# Patient Record
Sex: MALE | Race: WHITE | NOT HISPANIC OR LATINO
[De-identification: names, ages, dates, MRNs, and addresses within clinical notes are randomized per-mention and may not be internally consistent; named-entity substitution may affect disease eponyms.]

---

## 2017-01-05 ENCOUNTER — OTHER (OUTPATIENT)
Age: 47
End: 2017-01-05

## 2017-01-06 ENCOUNTER — APPOINTMENT (OUTPATIENT)
Dept: COLORECTAL SURGERY | Facility: HOSPITAL | Age: 47
End: 2017-01-06

## 2017-01-16 ENCOUNTER — FORM ENCOUNTER (OUTPATIENT)
Age: 47
End: 2017-01-16

## 2017-01-17 ENCOUNTER — OUTPATIENT (OUTPATIENT)
Dept: OUTPATIENT SERVICES | Facility: HOSPITAL | Age: 47
LOS: 1 days | End: 2017-01-17
Payer: COMMERCIAL

## 2017-01-17 ENCOUNTER — APPOINTMENT (OUTPATIENT)
Dept: CT IMAGING | Facility: CLINIC | Age: 47
End: 2017-01-17

## 2017-01-17 DIAGNOSIS — Z00.8 ENCOUNTER FOR OTHER GENERAL EXAMINATION: ICD-10-CM

## 2017-01-17 PROCEDURE — 74177 CT ABD & PELVIS W/CONTRAST: CPT

## 2017-01-18 ENCOUNTER — OUTPATIENT (OUTPATIENT)
Dept: OUTPATIENT SERVICES | Facility: HOSPITAL | Age: 47
LOS: 1 days | Discharge: HOME | End: 2017-01-18
Payer: COMMERCIAL

## 2017-01-18 ENCOUNTER — MESSAGE (OUTPATIENT)
Age: 47
End: 2017-01-18

## 2017-01-18 DIAGNOSIS — K57.20 DIVERTICULITIS OF LARGE INTESTINE WITH PERFORATION AND ABSCESS WITHOUT BLEEDING: ICD-10-CM

## 2017-01-18 PROCEDURE — 74270 X-RAY XM COLON 1CNTRST STD: CPT | Mod: 26

## 2017-01-20 ENCOUNTER — OUTPATIENT (OUTPATIENT)
Dept: OUTPATIENT SERVICES | Facility: HOSPITAL | Age: 47
LOS: 1 days | Discharge: HOME | End: 2017-01-20
Payer: COMMERCIAL

## 2017-01-20 ENCOUNTER — APPOINTMENT (OUTPATIENT)
Age: 47
End: 2017-01-20

## 2017-01-20 ENCOUNTER — APPOINTMENT (OUTPATIENT)
Dept: COLORECTAL SURGERY | Facility: HOSPITAL | Age: 47
End: 2017-01-20

## 2017-01-20 PROCEDURE — 45378 DIAGNOSTIC COLONOSCOPY: CPT

## 2017-01-25 ENCOUNTER — RESULT REVIEW (OUTPATIENT)
Age: 47
End: 2017-01-25

## 2017-01-26 ENCOUNTER — APPOINTMENT (OUTPATIENT)
Dept: COLORECTAL SURGERY | Facility: HOSPITAL | Age: 47
End: 2017-01-26

## 2017-01-26 ENCOUNTER — INPATIENT (INPATIENT)
Facility: HOSPITAL | Age: 47
LOS: 3 days | Discharge: HOME | End: 2017-01-30
Attending: COLON & RECTAL SURGERY | Admitting: COLON & RECTAL SURGERY
Payer: COMMERCIAL

## 2017-01-26 DIAGNOSIS — Z93.3 COLOSTOMY STATUS: ICD-10-CM

## 2017-01-26 DIAGNOSIS — N40.0 BENIGN PROSTATIC HYPERPLASIA WITHOUT LOWER URINARY TRACT SYMPTOMS: ICD-10-CM

## 2017-01-26 DIAGNOSIS — K57.20 DIVERTICULITIS OF LARGE INTESTINE WITH PERFORATION AND ABSCESS WITHOUT BLEEDING: ICD-10-CM

## 2017-01-26 DIAGNOSIS — K59.00 CONSTIPATION, UNSPECIFIED: ICD-10-CM

## 2017-01-26 DIAGNOSIS — F41.9 ANXIETY DISORDER, UNSPECIFIED: ICD-10-CM

## 2017-01-26 PROCEDURE — 44204 LAPARO PARTIAL COLECTOMY: CPT | Mod: 59

## 2017-01-26 PROCEDURE — 88304 TISSUE EXAM BY PATHOLOGIST: CPT | Mod: 26

## 2017-01-26 PROCEDURE — 52332 CYSTOSCOPY AND TREATMENT: CPT | Mod: LT

## 2017-01-26 PROCEDURE — 88307 TISSUE EXAM BY PATHOLOGIST: CPT | Mod: 26

## 2017-01-26 PROCEDURE — 44227 LAP CLOSE ENTEROSTOMY: CPT

## 2017-01-28 VITALS — WEIGHT: 238.1 LBS

## 2017-01-28 DIAGNOSIS — F32.9 MAJOR DEPRESSIVE DISORDER, SINGLE EPISODE, UNSPECIFIED: ICD-10-CM

## 2017-01-28 LAB
ALBUMIN SERPL ELPH-MCNC: 3.2 G/DL — LOW (ref 3.3–5)
ANION GAP SERPL CALC-SCNC: 9 MMOL/L — SIGNIFICANT CHANGE UP (ref 5–17)
BASOPHILS # BLD AUTO: 0.1 K/UL — SIGNIFICANT CHANGE UP (ref 0–0.2)
BASOPHILS NFR BLD AUTO: 0.6 % — SIGNIFICANT CHANGE UP (ref 0–2)
BUN SERPL-MCNC: 8 MG/DL — SIGNIFICANT CHANGE UP (ref 7–23)
CALCIUM SERPL-MCNC: 8.5 MG/DL — SIGNIFICANT CHANGE UP (ref 8.5–10.1)
CHLORIDE SERPL-SCNC: 101 MMOL/L — SIGNIFICANT CHANGE UP (ref 96–108)
CO2 SERPL-SCNC: 28 MMOL/L — SIGNIFICANT CHANGE UP (ref 22–31)
CREAT SERPL-MCNC: 0.81 MG/DL — SIGNIFICANT CHANGE UP (ref 0.5–1.3)
EOSINOPHIL # BLD AUTO: 0 K/UL — SIGNIFICANT CHANGE UP (ref 0–0.5)
EOSINOPHIL NFR BLD AUTO: 0.3 % — SIGNIFICANT CHANGE UP (ref 0–6)
GLUCOSE SERPL-MCNC: 103 MG/DL — HIGH (ref 70–99)
HCT VFR BLD CALC: 37.8 % — LOW (ref 39–50)
HGB BLD-MCNC: 12.7 G/DL — LOW (ref 13–17)
LYMPHOCYTES # BLD AUTO: 1 K/UL — SIGNIFICANT CHANGE UP (ref 1–3.3)
LYMPHOCYTES # BLD AUTO: 9.7 % — LOW (ref 13–44)
MAGNESIUM SERPL-MCNC: 2 MG/DL — SIGNIFICANT CHANGE UP (ref 1.8–2.4)
MCHC RBC-ENTMCNC: 30.6 PG — SIGNIFICANT CHANGE UP (ref 27–34)
MCHC RBC-ENTMCNC: 33.7 GM/DL — SIGNIFICANT CHANGE UP (ref 32–36)
MCV RBC AUTO: 91 FL — SIGNIFICANT CHANGE UP (ref 80–100)
MONOCYTES # BLD AUTO: 1.2 K/UL — HIGH (ref 0–0.9)
MONOCYTES NFR BLD AUTO: 11.6 % — SIGNIFICANT CHANGE UP (ref 2–14)
NEUTROPHILS # BLD AUTO: 8.1 K/UL — HIGH (ref 1.8–7.4)
NEUTROPHILS NFR BLD AUTO: 77.9 % — HIGH (ref 43–77)
PHOSPHATE SERPL-MCNC: 1.5 MG/DL — LOW (ref 2.5–4.5)
PLATELET # BLD AUTO: 156 K/UL — SIGNIFICANT CHANGE UP (ref 150–400)
POTASSIUM SERPL-MCNC: 3.7 MMOL/L — SIGNIFICANT CHANGE UP (ref 3.5–5.3)
POTASSIUM SERPL-SCNC: 3.7 MMOL/L — SIGNIFICANT CHANGE UP (ref 3.5–5.3)
RBC # BLD: 4.16 M/UL — LOW (ref 4.2–5.8)
RBC # FLD: 11.4 % — SIGNIFICANT CHANGE UP (ref 10.3–14.5)
SODIUM SERPL-SCNC: 138 MMOL/L — SIGNIFICANT CHANGE UP (ref 135–145)
WBC # BLD: 10.4 K/UL — SIGNIFICANT CHANGE UP (ref 3.8–10.5)
WBC # FLD AUTO: 10.4 K/UL — SIGNIFICANT CHANGE UP (ref 3.8–10.5)

## 2017-01-28 RX ORDER — TAMSULOSIN HYDROCHLORIDE 0.4 MG/1
0.4 CAPSULE ORAL AT BEDTIME
Qty: 0 | Refills: 0 | Status: DISCONTINUED | OUTPATIENT
Start: 2017-01-28 | End: 2017-01-30

## 2017-01-28 RX ORDER — DEXTROSE 50 % IN WATER 50 %
25 SYRINGE (ML) INTRAVENOUS ONCE
Qty: 0 | Refills: 0 | Status: DISCONTINUED | OUTPATIENT
Start: 2017-01-28 | End: 2017-01-30

## 2017-01-28 RX ORDER — GLUCAGON INJECTION, SOLUTION 0.5 MG/.1ML
1 INJECTION, SOLUTION SUBCUTANEOUS ONCE
Qty: 0 | Refills: 0 | Status: DISCONTINUED | OUTPATIENT
Start: 2017-01-28 | End: 2017-01-30

## 2017-01-28 RX ORDER — ONDANSETRON 8 MG/1
4 TABLET, FILM COATED ORAL EVERY 6 HOURS
Qty: 0 | Refills: 0 | Status: DISCONTINUED | OUTPATIENT
Start: 2017-01-28 | End: 2017-01-30

## 2017-01-28 RX ORDER — DIPHENHYDRAMINE HCL 50 MG
50 CAPSULE ORAL EVERY 6 HOURS
Qty: 0 | Refills: 0 | Status: DISCONTINUED | OUTPATIENT
Start: 2017-01-28 | End: 2017-01-30

## 2017-01-28 RX ORDER — HEPARIN SODIUM 5000 [USP'U]/ML
5000 INJECTION INTRAVENOUS; SUBCUTANEOUS EVERY 8 HOURS
Qty: 0 | Refills: 0 | Status: DISCONTINUED | OUTPATIENT
Start: 2017-01-28 | End: 2017-01-30

## 2017-01-28 RX ORDER — ESCITALOPRAM OXALATE 10 MG/1
10 TABLET, FILM COATED ORAL DAILY
Qty: 0 | Refills: 0 | Status: DISCONTINUED | OUTPATIENT
Start: 2017-01-28 | End: 2017-01-30

## 2017-01-28 RX ORDER — DEXTROSE 50 % IN WATER 50 %
1 SYRINGE (ML) INTRAVENOUS ONCE
Qty: 0 | Refills: 0 | Status: DISCONTINUED | OUTPATIENT
Start: 2017-01-28 | End: 2017-01-30

## 2017-01-28 RX ORDER — PANTOPRAZOLE SODIUM 20 MG/1
40 TABLET, DELAYED RELEASE ORAL DAILY
Qty: 0 | Refills: 0 | Status: DISCONTINUED | OUTPATIENT
Start: 2017-01-28 | End: 2017-01-30

## 2017-01-28 RX ORDER — DEXTROSE 50 % IN WATER 50 %
12.5 SYRINGE (ML) INTRAVENOUS ONCE
Qty: 0 | Refills: 0 | Status: DISCONTINUED | OUTPATIENT
Start: 2017-01-28 | End: 2017-01-30

## 2017-01-28 RX ORDER — ALVIMOPAN 12 MG/1
12 CAPSULE ORAL
Qty: 0 | Refills: 0 | Status: DISCONTINUED | OUTPATIENT
Start: 2017-01-28 | End: 2017-01-28

## 2017-01-28 RX ORDER — ALVIMOPAN 12 MG/1
12 CAPSULE ORAL EVERY 12 HOURS
Qty: 0 | Refills: 0 | Status: DISCONTINUED | OUTPATIENT
Start: 2017-01-28 | End: 2017-01-30

## 2017-01-28 RX ORDER — NALOXONE HYDROCHLORIDE 4 MG/.1ML
0.1 SPRAY NASAL
Qty: 0 | Refills: 0 | Status: DISCONTINUED | OUTPATIENT
Start: 2017-01-28 | End: 2017-01-30

## 2017-01-28 RX ORDER — SODIUM CHLORIDE 9 MG/ML
1000 INJECTION, SOLUTION INTRAVENOUS
Qty: 0 | Refills: 0 | Status: DISCONTINUED | OUTPATIENT
Start: 2017-01-28 | End: 2017-01-28

## 2017-01-28 RX ORDER — SODIUM CHLORIDE 9 MG/ML
1000 INJECTION, SOLUTION INTRAVENOUS
Qty: 0 | Refills: 0 | Status: DISCONTINUED | OUTPATIENT
Start: 2017-01-28 | End: 2017-01-30

## 2017-01-28 RX ORDER — POTASSIUM PHOSPHATE, MONOBASIC POTASSIUM PHOSPHATE, DIBASIC 236; 224 MG/ML; MG/ML
15 INJECTION, SOLUTION INTRAVENOUS ONCE
Qty: 0 | Refills: 0 | Status: COMPLETED | OUTPATIENT
Start: 2017-01-28 | End: 2017-01-28

## 2017-01-28 RX ORDER — ACETAMINOPHEN 500 MG
650 TABLET ORAL EVERY 6 HOURS
Qty: 0 | Refills: 0 | Status: DISCONTINUED | OUTPATIENT
Start: 2017-01-28 | End: 2017-01-30

## 2017-01-28 RX ADMIN — Medication 1 TABLET(S): at 23:57

## 2017-01-28 RX ADMIN — TAMSULOSIN HYDROCHLORIDE 0.4 MILLIGRAM(S): 0.4 CAPSULE ORAL at 22:48

## 2017-01-28 RX ADMIN — ESCITALOPRAM OXALATE 10 MILLIGRAM(S): 10 TABLET, FILM COATED ORAL at 23:57

## 2017-01-28 RX ADMIN — Medication 50 MILLIGRAM(S): at 23:03

## 2017-01-28 RX ADMIN — ALVIMOPAN 12 MILLIGRAM(S): 12 CAPSULE ORAL at 22:47

## 2017-01-28 RX ADMIN — POTASSIUM PHOSPHATE, MONOBASIC POTASSIUM PHOSPHATE, DIBASIC 63.75 MILLIMOLE(S): 236; 224 INJECTION, SOLUTION INTRAVENOUS at 23:57

## 2017-01-28 RX ADMIN — PANTOPRAZOLE SODIUM 40 MILLIGRAM(S): 20 TABLET, DELAYED RELEASE ORAL at 23:57

## 2017-01-28 RX ADMIN — HEPARIN SODIUM 5000 UNIT(S): 5000 INJECTION INTRAVENOUS; SUBCUTANEOUS at 22:50

## 2017-01-28 RX ADMIN — HEPARIN SODIUM 5000 UNIT(S): 5000 INJECTION INTRAVENOUS; SUBCUTANEOUS at 23:57

## 2017-01-28 NOTE — PROGRESS NOTE ADULT - ASSESSMENT
POD 2 s/p colostomy reversal which was performed for a history of perforated diverticulitis. Doing well    - Advance to full liquids  - stop IVFs  - continue PCA and transition to oral pain medication over next 24 hours  - replace phos  - Ambulate and IS  - VTE prophylaxis

## 2017-01-28 NOTE — PROGRESS NOTE ADULT - SUBJECTIVE AND OBJECTIVE BOX
Patient doing well. No fevers or chills. Passing flatus this morning. Ambulating. Complains of pain and still using PCA    Exam:   Vitals reviewed    General: alert, in no distress  Respiratory: non labored on room air  Cardiovascular: RRR  Abdomen: soft, some distension, appropriately tender, prevena in place    Labs:                        12.7   10.4  )-----------( 156      ( 28 Jan 2017 04:28 )             37.8   28 Jan 2017 04:28    138    |  101    |  8      ----------------------------<  103    3.7     |  28     |  0.81     Ca    8.5        28 Jan 2017 04:28  Phos  1.5       28 Jan 2017 04:28  Mg     2.0       28 Jan 2017 04:28    TPro  x      /  Alb  3.2    /  TBili  x      /  DBili  x      /  AST  x      /  ALT  x      /  AlkPhos  x      28 Jan 2017 04:28

## 2017-01-29 DIAGNOSIS — F41.9 ANXIETY DISORDER, UNSPECIFIED: ICD-10-CM

## 2017-01-29 DIAGNOSIS — K59.03 DRUG INDUCED CONSTIPATION: ICD-10-CM

## 2017-01-29 LAB
ANION GAP SERPL CALC-SCNC: 7 MMOL/L — SIGNIFICANT CHANGE UP (ref 5–17)
BUN SERPL-MCNC: 12 MG/DL — SIGNIFICANT CHANGE UP (ref 7–23)
CALCIUM SERPL-MCNC: 8.6 MG/DL — SIGNIFICANT CHANGE UP (ref 8.5–10.1)
CHLORIDE SERPL-SCNC: 100 MMOL/L — SIGNIFICANT CHANGE UP (ref 96–108)
CO2 SERPL-SCNC: 31 MMOL/L — SIGNIFICANT CHANGE UP (ref 22–31)
CREAT SERPL-MCNC: 1.05 MG/DL — SIGNIFICANT CHANGE UP (ref 0.5–1.3)
GLUCOSE SERPL-MCNC: 114 MG/DL — HIGH (ref 70–99)
HCT VFR BLD CALC: 42.1 % — SIGNIFICANT CHANGE UP (ref 39–50)
HGB BLD-MCNC: 14.1 G/DL — SIGNIFICANT CHANGE UP (ref 13–17)
MAGNESIUM SERPL-MCNC: 2.1 MG/DL — SIGNIFICANT CHANGE UP (ref 1.8–2.4)
MCHC RBC-ENTMCNC: 30.5 PG — SIGNIFICANT CHANGE UP (ref 27–34)
MCHC RBC-ENTMCNC: 33.4 GM/DL — SIGNIFICANT CHANGE UP (ref 32–36)
MCV RBC AUTO: 91.2 FL — SIGNIFICANT CHANGE UP (ref 80–100)
PHOSPHATE SERPL-MCNC: 2.5 MG/DL — SIGNIFICANT CHANGE UP (ref 2.5–4.5)
PLATELET # BLD AUTO: 193 K/UL — SIGNIFICANT CHANGE UP (ref 150–400)
POTASSIUM SERPL-MCNC: 4 MMOL/L — SIGNIFICANT CHANGE UP (ref 3.5–5.3)
POTASSIUM SERPL-SCNC: 4 MMOL/L — SIGNIFICANT CHANGE UP (ref 3.5–5.3)
RBC # BLD: 4.62 M/UL — SIGNIFICANT CHANGE UP (ref 4.2–5.8)
RBC # FLD: 11.4 % — SIGNIFICANT CHANGE UP (ref 10.3–14.5)
SODIUM SERPL-SCNC: 138 MMOL/L — SIGNIFICANT CHANGE UP (ref 135–145)
WBC # BLD: 9.4 K/UL — SIGNIFICANT CHANGE UP (ref 3.8–10.5)
WBC # FLD AUTO: 9.4 K/UL — SIGNIFICANT CHANGE UP (ref 3.8–10.5)

## 2017-01-29 PROCEDURE — 93010 ELECTROCARDIOGRAM REPORT: CPT

## 2017-01-29 PROCEDURE — 71010: CPT | Mod: 26

## 2017-01-29 RX ADMIN — TAMSULOSIN HYDROCHLORIDE 0.4 MILLIGRAM(S): 0.4 CAPSULE ORAL at 21:35

## 2017-01-29 RX ADMIN — HEPARIN SODIUM 5000 UNIT(S): 5000 INJECTION INTRAVENOUS; SUBCUTANEOUS at 21:36

## 2017-01-29 RX ADMIN — Medication 1 TABLET(S): at 12:49

## 2017-01-29 RX ADMIN — ALVIMOPAN 12 MILLIGRAM(S): 12 CAPSULE ORAL at 08:02

## 2017-01-29 RX ADMIN — ALVIMOPAN 12 MILLIGRAM(S): 12 CAPSULE ORAL at 17:44

## 2017-01-29 RX ADMIN — HEPARIN SODIUM 5000 UNIT(S): 5000 INJECTION INTRAVENOUS; SUBCUTANEOUS at 05:17

## 2017-01-29 RX ADMIN — PANTOPRAZOLE SODIUM 40 MILLIGRAM(S): 20 TABLET, DELAYED RELEASE ORAL at 12:49

## 2017-01-29 RX ADMIN — HEPARIN SODIUM 5000 UNIT(S): 5000 INJECTION INTRAVENOUS; SUBCUTANEOUS at 13:46

## 2017-01-29 RX ADMIN — NALOXONE HYDROCHLORIDE 0.1 MILLIGRAM(S): 4 SPRAY NASAL at 12:55

## 2017-01-29 RX ADMIN — ESCITALOPRAM OXALATE 10 MILLIGRAM(S): 10 TABLET, FILM COATED ORAL at 12:49

## 2017-01-29 NOTE — CONSULT NOTE ADULT - ASSESSMENT
S/p colostomy reversal on 1/26 for a history of perforated diverticulitis.  - Management as per surgery  - Advance diet to full Liquid  - D/C IV fluids  - pain meds  - replace low phosphorous
45 y/o male with anxiety s/p colostomy reversal after perforated diverticulitis POD#2

## 2017-01-29 NOTE — PROGRESS NOTE ADULT - SUBJECTIVE AND OBJECTIVE BOX
Subjective:    Had tachycardia and anxiety overnight. HR up to 130s which he attributes to abdominal pain and anxiety. He denies chest pain or shortness of breath. No bowel function yet. Denies nausea or vomiting.     Exam:  Vital Signs Last 24 Hrs  T(C): 36.6, Max: 37.7 (01-28 @ 21:48)  T(F): 97.8, Max: 99.9 (01-28 @ 21:48)  HR: 114 (82 - 134)  BP: 122/71 (122/71 - 148/92)  BP(mean): 83 (83 - 108)  RR: 13 (12 - 20)  SpO2: 99% (88% - 99%)    General: sitting in chair, in no distress  respiratory; non labored on room air  Cardiovascular: regular rhythm, tachycardic  Abdomen: soft, minimal tenderness, some distension, incision covered with prevena  Neuro: alert and oriented x 3  Extremities: no edema/swelling or pain

## 2017-01-29 NOTE — PROGRESS NOTE ADULT - ASSESSMENT
POD 3 s/p colostomy reversal. He is tachycardic this morning which may be from anxiety. No dyspnea or shortness of breath to suggest PE.   Labs pending this morning. Will obtain EKG and CXR and monitor    remain on full liquids  Ambulate and IS  continue PCA for now for pain control  VTE prophylaxis  Will get chest CT if does not improve.

## 2017-01-29 NOTE — CONSULT NOTE ADULT - PROBLEM SELECTOR RECOMMENDATION 9
Mood stable on home med lexapro
- s/p colostomy reversal   - plan per primary team  - suggest transition to PO pain medication regimen

## 2017-01-29 NOTE — CONSULT NOTE ADULT - PROBLEM SELECTOR RECOMMENDATION 4
- may give xanax 0.25 mg PO Q 8hours prn anxiety  - ekg demonstrates NSR, chest pain not likely cardiac

## 2017-01-29 NOTE — CONSULT NOTE ADULT - PROBLEM SELECTOR PROBLEM 1
Depression, unspecified depression type
Diverticulitis of large intestine with perforation and abscess without bleeding

## 2017-01-29 NOTE — CONSULT NOTE ADULT - SUBJECTIVE AND OBJECTIVE BOX
HPI:  46 year old male with a PMHx anxiety/depression.  He is s/p colostomy reversal on 1/28 for a history of perforated diverticulitis.       SUBJECTIVE & OBJECTIVE: Pt seen and examined at bedside.     PHYSICAL EXAM:  T(C): 37.7, Max: 37.7 (01-28 @ 21:48)  HR: 83 (82 - 101)  BP: 123/78 (123/78 - 148/92)  RR: 15 (12 - 20)  SpO2: 98% (88% - 98%)  Wt(kg): --   Weight (kg): 108 (01-28 @ 10:23)  GENERAL: NAD, well-groomed, well-developed  HEAD:  Atraumatic, Normocephalic  EYES: EOMI, PERRLA, conjunctiva and sclera clear  ENMT: Moist mucous membranes  NECK: Supple, No JVD  NERVOUS SYSTEM:  Alert & Oriented X3, Motor Strength 5/5 B/L upper and lower extremities; DTRs 2+ intact and symmetric  CHEST/LUNG: Clear to auscultation bilaterally; No rales, rhonchi, wheezing, or rubs  HEART: Regular rate and rhythm; No murmurs, rubs, or gallops  ABDOMEN: Soft, Nontender, Nondistended; Bowel sounds present  EXTREMITIES:  2+ Peripheral Pulses, No clubbing, cyanosis, or edema        MEDICATIONS  (STANDING):  dextrose 50% Injectable 12.5Gram(s) IV Push once  dextrose 50% Injectable 25Gram(s) IV Push once  dextrose 50% Injectable 25Gram(s) IV Push once  escitalopram 10milliGRAM(s) Oral daily  heparin  Injectable 5000Unit(s) SubCutaneous every 8 hours  multivitamin 1Tablet(s) Oral daily  pantoprazole  Injectable 40milliGRAM(s) IV Push daily  tamsulosin 0.4milliGRAM(s) Oral at bedtime  dextrose 5%. 1000milliLiter(s) IV Continuous <Continuous>  dextrose 50% Injectable 12.5Gram(s) IV Push once  dextrose 50% Injectable 25Gram(s) IV Push once  dextrose 50% Injectable 25Gram(s) IV Push once  potassium phosphate IVPB 15milliMole(s) IV Intermittent once  alvimopan 12milliGRAM(s) Oral every 12 hours    MEDICATIONS  (PRN):  dextrose Gel 1Dose(s) Oral once PRN Blood Glucose LESS THAN 70 milliGRAM(s)/deciLiter  glucagon  Injectable 1milliGRAM(s) IntraMuscular once PRN Glucose <70 milliGRAM(s)/deciLiter  acetaminophen   Tablet 650milliGRAM(s) Oral every 6 hours PRN For Temp greater than 38.5 C (101.3 F)  aluminum hydroxide/magnesium hydroxide/simethicone Suspension 30milliLiter(s) Oral every 8 hours PRN Dyspepsia  naloxone Injectable 0.1milliGRAM(s) IV Push every 2 hours PRN itching  ondansetron  IVPB 4milliGRAM(s) IV Intermittent every 6 hours PRN Nausea and/or Vomiting  dextrose Gel 1Dose(s) Oral once PRN Blood Glucose LESS THAN 70 milliGRAM(s)/deciLiter  glucagon  Injectable 1milliGRAM(s) IntraMuscular once PRN Glucose <70 milliGRAM(s)/deciLiter  diphenhydrAMINE   Injectable 50milliGRAM(s) IV Push every 6 hours PRN Rash and/or Itching      LABS:                        12.7   10.4  )-----------( 156      ( 28 Jan 2017 04:28 )             37.8     28 Jan 2017 04:28    138    |  101    |  8      ----------------------------<  103    3.7     |  28     |  0.81     Ca    8.5        28 Jan 2017 04:28  Phos  1.5       28 Jan 2017 04:28  Mg     2.0       28 Jan 2017 04:28    TPro  x      /  Alb  3.2    /  TBili  x      /  DBili  x      /  AST  x      /  ALT  x      /  AlkPhos  x      28 Jan 2017 04:28        Magnesium, Serum: 2.0 mg/dL (01-28 @ 04:28)                        DVT/GI prophylaxsis: SQ heparin/Protonix   Discussed with pt @ bedside
46 year old male with a PMHx anxiety/depression.  He is s/p colostomy reversal on 1/28 for a history of perforated diverticulitis.     1/29/17 - Patient seen and examined at bedside, NAD.  Comments on having an episode of anxiety last night associated with elevated HR and chest pressure.  Denies these symptoms at present.  Tolerating PO.  Complains of some abdominal distention but denies nausea or vomiting.     Medications  dextrose Gel 1Dose(s) Oral once PRN  dextrose 50% Injectable 12.5Gram(s) IV Push once  dextrose 50% Injectable 25Gram(s) IV Push once  dextrose 50% Injectable 25Gram(s) IV Push once  glucagon  Injectable 1milliGRAM(s) IntraMuscular once PRN  escitalopram 10milliGRAM(s) Oral daily  heparin  Injectable 5000Unit(s) SubCutaneous every 8 hours  multivitamin 1Tablet(s) Oral daily  pantoprazole  Injectable 40milliGRAM(s) IV Push daily  tamsulosin 0.4milliGRAM(s) Oral at bedtime  acetaminophen   Tablet 650milliGRAM(s) Oral every 6 hours PRN  aluminum hydroxide/magnesium hydroxide/simethicone Suspension 30milliLiter(s) Oral every 8 hours PRN  naloxone Injectable 0.1milliGRAM(s) IV Push every 2 hours PRN  ondansetron  IVPB 4milliGRAM(s) IV Intermittent every 6 hours PRN  dextrose 5%. 1000milliLiter(s) IV Continuous <Continuous>  dextrose Gel 1Dose(s) Oral once PRN  dextrose 50% Injectable 12.5Gram(s) IV Push once  dextrose 50% Injectable 25Gram(s) IV Push once  dextrose 50% Injectable 25Gram(s) IV Push once  glucagon  Injectable 1milliGRAM(s) IntraMuscular once PRN  diphenhydrAMINE   Injectable 50milliGRAM(s) IV Push every 6 hours PRN  alvimopan 12milliGRAM(s) Oral every 12 hours      Physical Examination      T(C): 36.4, Max: 37.7 (01-28 @ 21:48)  HR: 99 (83 - 134)  BP: 133/89 (119/84 - 146/93)  RR: 12 (12 - 20)  SpO2: 97% (93% - 100%)  Wt(kg): --    Constitutional: NAD   Eyes: PERRLA, EOMI   Neck: supple no JVD  Respiratory: CTAB, no wheezing, or rhonci  Cardiovascular: S1, S2 no murmurs, rubs or gallops  Gastrointestinal: soft,+ Bowel sounds  Extremities: non-tender, no edema  Neurological: AAO x 3 no focal deficits  Musculoskeletal: 5/5 strength throughout, normal ROM    Medications    dextrose Gel 1Dose(s) Oral once PRN  dextrose 50% Injectable 12.5Gram(s) IV Push once  dextrose 50% Injectable 25Gram(s) IV Push once  dextrose 50% Injectable 25Gram(s) IV Push once  glucagon  Injectable 1milliGRAM(s) IntraMuscular once PRN  escitalopram 10milliGRAM(s) Oral daily  heparin  Injectable 5000Unit(s) SubCutaneous every 8 hours  multivitamin 1Tablet(s) Oral daily  pantoprazole  Injectable 40milliGRAM(s) IV Push daily  tamsulosin 0.4milliGRAM(s) Oral at bedtime  acetaminophen   Tablet 650milliGRAM(s) Oral every 6 hours PRN  aluminum hydroxide/magnesium hydroxide/simethicone Suspension 30milliLiter(s) Oral every 8 hours PRN  naloxone Injectable 0.1milliGRAM(s) IV Push every 2 hours PRN  ondansetron  IVPB 4milliGRAM(s) IV Intermittent every 6 hours PRN  dextrose 5%. 1000milliLiter(s) IV Continuous <Continuous>  dextrose Gel 1Dose(s) Oral once PRN  dextrose 50% Injectable 12.5Gram(s) IV Push once  dextrose 50% Injectable 25Gram(s) IV Push once  dextrose 50% Injectable 25Gram(s) IV Push once  glucagon  Injectable 1milliGRAM(s) IntraMuscular once PRN  diphenhydrAMINE   Injectable 50milliGRAM(s) IV Push every 6 hours PRN  alvimopan 12milliGRAM(s) Oral every 12 hours        Labs                          14.1   9.4   )-----------( 193      ( 29 Jan 2017 12:30 )             42.1     29 Jan 2017 12:30    138    |  100    |  12     ----------------------------<  114    4.0     |  31     |  1.05     Ca    8.6        29 Jan 2017 12:30  Phos  2.5       29 Jan 2017 12:30  Mg     2.1       29 Jan 2017 12:30    TPro  x      /  Alb  3.2    /  TBili  x      /  DBili  x      /  AST  x      /  ALT  x      /  AlkPhos  x      28 Jan 2017 04:28

## 2017-01-30 ENCOUNTER — TRANSCRIPTION ENCOUNTER (OUTPATIENT)
Age: 47
End: 2017-01-30

## 2017-01-30 ENCOUNTER — OTHER (OUTPATIENT)
Age: 47
End: 2017-01-30

## 2017-01-30 VITALS — TEMPERATURE: 101 F

## 2017-01-30 DIAGNOSIS — K57.32 DIVERTICULITIS OF LARGE INTESTINE WITHOUT PERFORATION OR ABSCESS WITHOUT BLEEDING: ICD-10-CM

## 2017-01-30 DIAGNOSIS — Z90.49 ACQUIRED ABSENCE OF OTHER SPECIFIED PARTS OF DIGESTIVE TRACT: ICD-10-CM

## 2017-01-30 DIAGNOSIS — K57.30 DIVERTICULOSIS OF LARGE INTESTINE WITHOUT PERFORATION OR ABSCESS WITHOUT BLEEDING: ICD-10-CM

## 2017-01-30 LAB — SURGICAL PATHOLOGY FINAL REPORT - CH: SIGNIFICANT CHANGE UP

## 2017-01-30 RX ORDER — POLYETHYLENE GLYCOL 3350 17 G/17G
17 POWDER, FOR SOLUTION ORAL DAILY
Qty: 0 | Refills: 0 | Status: DISCONTINUED | OUTPATIENT
Start: 2017-01-30 | End: 2017-01-30

## 2017-01-30 RX ORDER — ESOMEPRAZOLE MAGNESIUM 40 MG/1
1 CAPSULE, DELAYED RELEASE ORAL
Qty: 30 | Refills: 2 | OUTPATIENT
Start: 2017-01-30

## 2017-01-30 RX ORDER — HYDROMORPHONE HYDROCHLORIDE 2 MG/ML
1 INJECTION INTRAMUSCULAR; INTRAVENOUS; SUBCUTANEOUS
Qty: 0 | Refills: 0 | Status: DISCONTINUED | OUTPATIENT
Start: 2017-01-30 | End: 2017-01-30

## 2017-01-30 RX ADMIN — PANTOPRAZOLE SODIUM 40 MILLIGRAM(S): 20 TABLET, DELAYED RELEASE ORAL at 13:20

## 2017-01-30 RX ADMIN — ESCITALOPRAM OXALATE 10 MILLIGRAM(S): 10 TABLET, FILM COATED ORAL at 11:42

## 2017-01-30 RX ADMIN — POLYETHYLENE GLYCOL 3350 17 GRAM(S): 17 POWDER, FOR SOLUTION ORAL at 11:42

## 2017-01-30 RX ADMIN — HEPARIN SODIUM 5000 UNIT(S): 5000 INJECTION INTRAVENOUS; SUBCUTANEOUS at 05:20

## 2017-01-30 RX ADMIN — Medication 1 TABLET(S): at 11:42

## 2017-01-30 RX ADMIN — ALVIMOPAN 12 MILLIGRAM(S): 12 CAPSULE ORAL at 05:20

## 2017-01-30 NOTE — DISCHARGE NOTE ADULT - MEDICATION SUMMARY - MEDICATIONS TO TAKE
I will START or STAY ON the medications listed below when I get home from the hospital:    Percocet 5/325 oral tablet  -- 1 tab(s) by mouth every 6 hours, As Needed -for moderate pain MDD:6 tabs  -- Caution federal law prohibits the transfer of this drug to any person other  than the person for whom it was prescribed.  May cause drowsiness.  Alcohol may intensify this effect.  Use care when operating dangerous machinery.  This prescription cannot be refilled.  This product contains acetaminophen.  Do not use  with any other product containing acetaminophen to prevent possible liver damage.  Using more of this medication than prescribed may cause serious breathing problems.    -- Indication: For Pain    esomeprazole 20 mg oral delayed release capsule  -- 1 cap(s) by mouth once a day  -- It is very important that you take or use this exactly as directed.  Do not skip doses or discontinue unless directed by your doctor.  Obtain medical advice before taking any non-prescription drugs as some may affect the action of this medication.  Swallow whole.  Do not crush.  Take medication on an empty stomach 1 hour before or 2 to 3 hours after a meal unless otherwise directed by your doctor.    -- Indication: For Acid reflux

## 2017-01-30 NOTE — DISCHARGE NOTE ADULT - CARE PROVIDER_API CALL
Dinesh Goldman), ColonRectal Surgery; Surgery  [address] [facility] [address] [place] [address]  Phone: [phone]  Fax: [phone]

## 2017-01-30 NOTE — DISCHARGE NOTE ADULT - PATIENT PORTAL LINK FT
“You can access the Follow[employer] Patient Portal, offered by [facility], by registering with the following website: [email or web address]”

## 2017-01-30 NOTE — PROGRESS NOTE ADULT - ASSESSMENT
POD 4 s/p colostomy reversal. Doing well.     low residue diet  d/c fluids and PCA  bowel regimen  Ambulate and IS  VTE prophylaxis  Discharge home today or tomorrow.

## 2017-01-30 NOTE — DISCHARGE NOTE ADULT - NS AS ACTIVITY OBS
Walking-Indoors allowed/No Heavy lifting/straining/Walking-Outdoors allowed/Do not drive or operate machinery/Stairs allowed/Showering allowed

## 2017-01-30 NOTE — DISCHARGE NOTE ADULT - CARE PLAN
Principal Discharge DX:	Diverticulitis of large intestine with perforation and abscess without bleeding  Goal:	Have regular bowel movements  Instructions for follow-up, activity and diet:	Stay on low fiber diet. No heavy lifting. Use stool softeners as needed. Follow up in 2 weeks with Dr. Goldman for staple removal

## 2017-01-30 NOTE — DISCHARGE NOTE ADULT - PLAN OF CARE
Have regular bowel movements Stay on low fiber diet. No heavy lifting. Use stool softeners as needed. Follow up in 2 weeks with Dr. Goldman for staple removal

## 2017-01-30 NOTE — DISCHARGE NOTE ADULT - HOSPITAL COURSE
Patient underwent a colostomy reversal and did well from surgery. He was initiated on diet slowly and advanced to low residue diet which he was tolerating at the time of discharge. He did develop tachycardia on POD 3 which was evaluated with EKG and chest x ray and were normal. He has a history of anxiety which as contributing to his tachycardia and his heart rate normalized once pain was better controlled. He was having bowel function prior to discharge home.

## 2017-01-30 NOTE — DISCHARGE NOTE ADULT - CARE PROVIDERS DIRECT ADDRESSES
[email or web address]

## 2017-01-30 NOTE — PROGRESS NOTE ADULT - SUBJECTIVE AND OBJECTIVE BOX
Doing well this morning. Passing flatus. Tachycardia resolved. Pain well controlled    Exam:   Vital Signs Last 24 Hrs  T(C): 37.7, Max: 37.7 (01-30 @ 05:00)  T(F): 99.9, Max: 99.9 (01-30 @ 05:00)  HR: 92 (72 - 131)  BP: 116/74 (105/71 - 148/91)  BP(mean): 82 (80 - 106)  RR: 15 (12 - 18)  SpO2: 92% (92% - 100%)    General: in no distress  Respiratory: non labored on room air  Cards: RRR  Abdomen: soft, non distended, incision clean and intact with staples. No sign of infection  Neuro: alert and oriented                          13.1   7.8   )-----------( 180      ( 30 Jan 2017 04:40 )             38.7   30 Jan 2017 04:40    139    |  103    |  13     ----------------------------<  103    3.8     |  28     |  0.79     Ca    8.5        30 Jan 2017 04:40  Phos  2.9       30 Jan 2017 04:40  Mg     2.0       30 Jan 2017 04:40    TPro  x      /  Alb  2.7    /  TBili  x      /  DBili  x      /  AST  x      /  ALT  x      /  AlkPhos  x      30 Jan 2017 04:40

## 2017-02-02 DIAGNOSIS — K21.9 GASTRO-ESOPHAGEAL REFLUX DISEASE WITHOUT ESOPHAGITIS: ICD-10-CM

## 2017-02-02 DIAGNOSIS — N40.0 BENIGN PROSTATIC HYPERPLASIA WITHOUT LOWER URINARY TRACT SYMPTOMS: ICD-10-CM

## 2017-02-02 DIAGNOSIS — Z43.3 ENCOUNTER FOR ATTENTION TO COLOSTOMY: ICD-10-CM

## 2017-02-02 DIAGNOSIS — K59.03 DRUG INDUCED CONSTIPATION: ICD-10-CM

## 2017-02-02 DIAGNOSIS — R00.0 TACHYCARDIA, UNSPECIFIED: ICD-10-CM

## 2017-02-02 DIAGNOSIS — Z79.899 OTHER LONG TERM (CURRENT) DRUG THERAPY: ICD-10-CM

## 2017-02-02 DIAGNOSIS — K57.20 DIVERTICULITIS OF LARGE INTESTINE WITH PERFORATION AND ABSCESS WITHOUT BLEEDING: ICD-10-CM

## 2017-02-02 DIAGNOSIS — Z98.84 BARIATRIC SURGERY STATUS: ICD-10-CM

## 2017-02-02 DIAGNOSIS — F32.9 MAJOR DEPRESSIVE DISORDER, SINGLE EPISODE, UNSPECIFIED: ICD-10-CM

## 2017-02-02 DIAGNOSIS — F41.9 ANXIETY DISORDER, UNSPECIFIED: ICD-10-CM

## 2017-02-08 ENCOUNTER — APPOINTMENT (OUTPATIENT)
Dept: COLORECTAL SURGERY | Facility: CLINIC | Age: 47
End: 2017-02-08

## 2017-02-08 VITALS
HEIGHT: 71 IN | SYSTOLIC BLOOD PRESSURE: 140 MMHG | DIASTOLIC BLOOD PRESSURE: 82 MMHG | TEMPERATURE: 98 F | HEART RATE: 69 BPM | RESPIRATION RATE: 14 BRPM

## 2017-02-24 ENCOUNTER — APPOINTMENT (OUTPATIENT)
Dept: COLORECTAL SURGERY | Facility: CLINIC | Age: 47
End: 2017-02-24

## 2017-02-24 VITALS
RESPIRATION RATE: 14 BRPM | SYSTOLIC BLOOD PRESSURE: 158 MMHG | WEIGHT: 220 LBS | DIASTOLIC BLOOD PRESSURE: 109 MMHG | BODY MASS INDEX: 30.8 KG/M2 | TEMPERATURE: 98.2 F | HEIGHT: 71 IN | HEART RATE: 80 BPM

## 2017-02-24 RX ORDER — POLYETHYLENE GLYCOL 3350, SODIUM SULFATE, SODIUM CHLORIDE, POTASSIUM CHLORIDE, ASCORBIC ACID, SODIUM ASCORBATE 7.5-2.691G
100 KIT ORAL
Qty: 1 | Refills: 0 | Status: DISCONTINUED | COMMUNITY
Start: 2017-01-05 | End: 2017-02-24

## 2017-03-08 ENCOUNTER — APPOINTMENT (OUTPATIENT)
Dept: COLORECTAL SURGERY | Facility: CLINIC | Age: 47
End: 2017-03-08

## 2017-03-08 VITALS
TEMPERATURE: 98 F | HEIGHT: 71 IN | DIASTOLIC BLOOD PRESSURE: 81 MMHG | BODY MASS INDEX: 30.8 KG/M2 | RESPIRATION RATE: 14 BRPM | WEIGHT: 220 LBS | SYSTOLIC BLOOD PRESSURE: 120 MMHG

## 2017-03-08 DIAGNOSIS — Z00.00 ENCOUNTER FOR GENERAL ADULT MEDICAL EXAMINATION W/OUT ABNORMAL FINDINGS: ICD-10-CM

## 2017-03-08 DIAGNOSIS — K57.20 DIVERTICULITIS OF LARGE INTESTINE WITH PERFORATION AND ABSCESS W/OUT BLEEDING: ICD-10-CM

## 2017-03-27 ENCOUNTER — APPOINTMENT (OUTPATIENT)
Dept: COLORECTAL SURGERY | Facility: CLINIC | Age: 47
End: 2017-03-27

## 2017-03-27 VITALS
HEART RATE: 61 BPM | SYSTOLIC BLOOD PRESSURE: 158 MMHG | HEIGHT: 71 IN | TEMPERATURE: 97.8 F | OXYGEN SATURATION: 98 % | BODY MASS INDEX: 30.8 KG/M2 | DIASTOLIC BLOOD PRESSURE: 98 MMHG | WEIGHT: 220 LBS

## 2017-04-07 ENCOUNTER — OTHER (OUTPATIENT)
Age: 47
End: 2017-04-07

## 2017-04-12 ENCOUNTER — FORM ENCOUNTER (OUTPATIENT)
Age: 47
End: 2017-04-12

## 2017-04-12 ENCOUNTER — OTHER (OUTPATIENT)
Age: 47
End: 2017-04-12

## 2017-04-13 ENCOUNTER — APPOINTMENT (OUTPATIENT)
Dept: MRI IMAGING | Facility: CLINIC | Age: 47
End: 2017-04-13

## 2017-04-13 ENCOUNTER — OUTPATIENT (OUTPATIENT)
Dept: OUTPATIENT SERVICES | Facility: HOSPITAL | Age: 47
LOS: 1 days | End: 2017-04-13
Payer: COMMERCIAL

## 2017-04-13 DIAGNOSIS — Z00.8 ENCOUNTER FOR OTHER GENERAL EXAMINATION: ICD-10-CM

## 2017-04-13 PROCEDURE — 72197 MRI PELVIS W/O & W/DYE: CPT

## 2017-04-13 PROCEDURE — A9585: CPT

## 2017-04-20 ENCOUNTER — OTHER (OUTPATIENT)
Age: 47
End: 2017-04-20

## 2017-05-08 ENCOUNTER — APPOINTMENT (OUTPATIENT)
Dept: COLORECTAL SURGERY | Facility: CLINIC | Age: 47
End: 2017-05-08

## 2017-05-08 DIAGNOSIS — R19.8 OTHER SPECIFIED SYMPTOMS AND SIGNS INVOLVING THE DIGESTIVE SYSTEM AND ABDOMEN: ICD-10-CM

## 2017-05-15 PROBLEM — R19.8 RECTAL DISCHARGE: Status: ACTIVE | Noted: 2017-05-15

## 2017-09-14 ENCOUNTER — EMERGENCY (EMERGENCY)
Facility: HOSPITAL | Age: 47
LOS: 0 days | Discharge: HOME | End: 2017-09-14
Attending: EMERGENCY MEDICINE | Admitting: EMERGENCY MEDICINE
Payer: COMMERCIAL

## 2017-09-14 VITALS
SYSTOLIC BLOOD PRESSURE: 107 MMHG | DIASTOLIC BLOOD PRESSURE: 52 MMHG | RESPIRATION RATE: 14 BRPM | OXYGEN SATURATION: 100 % | HEART RATE: 58 BPM | TEMPERATURE: 98 F

## 2017-09-14 VITALS — HEIGHT: 71 IN | WEIGHT: 214.95 LBS

## 2017-09-14 DIAGNOSIS — R06.00 DYSPNEA, UNSPECIFIED: ICD-10-CM

## 2017-09-14 DIAGNOSIS — F41.9 ANXIETY DISORDER, UNSPECIFIED: ICD-10-CM

## 2017-09-14 DIAGNOSIS — R06.02 SHORTNESS OF BREATH: ICD-10-CM

## 2017-09-14 DIAGNOSIS — K62.5 HEMORRHAGE OF ANUS AND RECTUM: ICD-10-CM

## 2017-09-14 LAB
ALBUMIN SERPL ELPH-MCNC: 4 G/DL — SIGNIFICANT CHANGE UP (ref 3.3–5)
ALP SERPL-CCNC: 61 U/L — SIGNIFICANT CHANGE UP (ref 40–120)
ALT FLD-CCNC: 35 U/L — SIGNIFICANT CHANGE UP (ref 12–78)
ANION GAP SERPL CALC-SCNC: 8 MMOL/L — SIGNIFICANT CHANGE UP (ref 5–17)
APTT BLD: 31.7 SEC — SIGNIFICANT CHANGE UP (ref 27.5–37.4)
AST SERPL-CCNC: 18 U/L — SIGNIFICANT CHANGE UP (ref 15–37)
BASOPHILS # BLD AUTO: 0.1 K/UL — SIGNIFICANT CHANGE UP (ref 0–0.2)
BASOPHILS # BLD AUTO: 0.1 K/UL — SIGNIFICANT CHANGE UP (ref 0–0.2)
BASOPHILS NFR BLD AUTO: 1.6 % — SIGNIFICANT CHANGE UP (ref 0–2)
BASOPHILS NFR BLD AUTO: 1.6 % — SIGNIFICANT CHANGE UP (ref 0–2)
BILIRUB SERPL-MCNC: 0.4 MG/DL — SIGNIFICANT CHANGE UP (ref 0.2–1.2)
BUN SERPL-MCNC: 17 MG/DL — SIGNIFICANT CHANGE UP (ref 7–23)
CALCIUM SERPL-MCNC: 8.7 MG/DL — SIGNIFICANT CHANGE UP (ref 8.5–10.1)
CHLORIDE SERPL-SCNC: 106 MMOL/L — SIGNIFICANT CHANGE UP (ref 96–108)
CO2 SERPL-SCNC: 27 MMOL/L — SIGNIFICANT CHANGE UP (ref 22–31)
CREAT SERPL-MCNC: 1.3 MG/DL — SIGNIFICANT CHANGE UP (ref 0.5–1.3)
D DIMER BLD IA.RAPID-MCNC: <150 NG/ML DDU — SIGNIFICANT CHANGE UP
EOSINOPHIL # BLD AUTO: 0.1 K/UL — SIGNIFICANT CHANGE UP (ref 0–0.5)
EOSINOPHIL # BLD AUTO: 0.1 K/UL — SIGNIFICANT CHANGE UP (ref 0–0.5)
EOSINOPHIL NFR BLD AUTO: 1.8 % — SIGNIFICANT CHANGE UP (ref 0–6)
EOSINOPHIL NFR BLD AUTO: 1.9 % — SIGNIFICANT CHANGE UP (ref 0–6)
GLUCOSE SERPL-MCNC: 102 MG/DL — HIGH (ref 70–99)
HCT VFR BLD CALC: 38 % — LOW (ref 39–50)
HCT VFR BLD CALC: 39.4 % — SIGNIFICANT CHANGE UP (ref 39–50)
HGB BLD-MCNC: 13 G/DL — SIGNIFICANT CHANGE UP (ref 13–17)
HGB BLD-MCNC: 13.8 G/DL — SIGNIFICANT CHANGE UP (ref 13–17)
INR BLD: 1.01 RATIO — SIGNIFICANT CHANGE UP (ref 0.88–1.16)
LIDOCAIN IGE QN: 126 U/L — SIGNIFICANT CHANGE UP (ref 73–393)
LYMPHOCYTES # BLD AUTO: 1.7 K/UL — SIGNIFICANT CHANGE UP (ref 1–3.3)
LYMPHOCYTES # BLD AUTO: 2.8 K/UL — SIGNIFICANT CHANGE UP (ref 1–3.3)
LYMPHOCYTES # BLD AUTO: 25.8 % — SIGNIFICANT CHANGE UP (ref 13–44)
LYMPHOCYTES # BLD AUTO: 33.7 % — SIGNIFICANT CHANGE UP (ref 13–44)
MCHC RBC-ENTMCNC: 30.9 PG — SIGNIFICANT CHANGE UP (ref 27–34)
MCHC RBC-ENTMCNC: 31.2 PG — SIGNIFICANT CHANGE UP (ref 27–34)
MCHC RBC-ENTMCNC: 34.2 GM/DL — SIGNIFICANT CHANGE UP (ref 32–36)
MCHC RBC-ENTMCNC: 35 GM/DL — SIGNIFICANT CHANGE UP (ref 32–36)
MCV RBC AUTO: 89.2 FL — SIGNIFICANT CHANGE UP (ref 80–100)
MCV RBC AUTO: 90.4 FL — SIGNIFICANT CHANGE UP (ref 80–100)
MONOCYTES # BLD AUTO: 0.7 K/UL — SIGNIFICANT CHANGE UP (ref 0–0.9)
MONOCYTES # BLD AUTO: 0.9 K/UL — SIGNIFICANT CHANGE UP (ref 0–0.9)
MONOCYTES NFR BLD AUTO: 10.3 % — SIGNIFICANT CHANGE UP (ref 2–14)
MONOCYTES NFR BLD AUTO: 10.8 % — SIGNIFICANT CHANGE UP (ref 2–14)
NEUTROPHILS # BLD AUTO: 4.1 K/UL — SIGNIFICANT CHANGE UP (ref 1.8–7.4)
NEUTROPHILS # BLD AUTO: 4.3 K/UL — SIGNIFICANT CHANGE UP (ref 1.8–7.4)
NEUTROPHILS NFR BLD AUTO: 52.2 % — SIGNIFICANT CHANGE UP (ref 43–77)
NEUTROPHILS NFR BLD AUTO: 60.4 % — SIGNIFICANT CHANGE UP (ref 43–77)
NT-PROBNP SERPL-SCNC: 22 PG/ML — SIGNIFICANT CHANGE UP (ref 0–125)
PLATELET # BLD AUTO: 168 K/UL — SIGNIFICANT CHANGE UP (ref 150–400)
PLATELET # BLD AUTO: 189 K/UL — SIGNIFICANT CHANGE UP (ref 150–400)
POTASSIUM SERPL-MCNC: 3.5 MMOL/L — SIGNIFICANT CHANGE UP (ref 3.5–5.3)
POTASSIUM SERPL-SCNC: 3.5 MMOL/L — SIGNIFICANT CHANGE UP (ref 3.5–5.3)
PROT SERPL-MCNC: 7.3 GM/DL — SIGNIFICANT CHANGE UP (ref 6–8.3)
PROTHROM AB SERPL-ACNC: 10.9 SEC — SIGNIFICANT CHANGE UP (ref 9.8–12.7)
RBC # BLD: 4.2 M/UL — SIGNIFICANT CHANGE UP (ref 4.2–5.8)
RBC # BLD: 4.42 M/UL — SIGNIFICANT CHANGE UP (ref 4.2–5.8)
RBC # FLD: 11.3 % — SIGNIFICANT CHANGE UP (ref 10.3–14.5)
RBC # FLD: 11.7 % — SIGNIFICANT CHANGE UP (ref 10.3–14.5)
SODIUM SERPL-SCNC: 141 MMOL/L — SIGNIFICANT CHANGE UP (ref 135–145)
TROPONIN I SERPL-MCNC: <0.015 NG/ML — SIGNIFICANT CHANGE UP (ref 0.01–0.04)
TROPONIN I SERPL-MCNC: <0.015 NG/ML — SIGNIFICANT CHANGE UP (ref 0.01–0.04)
WBC # BLD: 6.8 K/UL — SIGNIFICANT CHANGE UP (ref 3.8–10.5)
WBC # BLD: 8.2 K/UL — SIGNIFICANT CHANGE UP (ref 3.8–10.5)
WBC # FLD AUTO: 6.8 K/UL — SIGNIFICANT CHANGE UP (ref 3.8–10.5)
WBC # FLD AUTO: 8.2 K/UL — SIGNIFICANT CHANGE UP (ref 3.8–10.5)

## 2017-09-14 PROCEDURE — 93010 ELECTROCARDIOGRAM REPORT: CPT

## 2017-09-14 PROCEDURE — 99285 EMERGENCY DEPT VISIT HI MDM: CPT

## 2017-09-14 PROCEDURE — 71010: CPT | Mod: 26

## 2017-09-14 RX ORDER — ALPRAZOLAM 0.25 MG
0.25 TABLET ORAL ONCE
Qty: 0 | Refills: 0 | Status: DISCONTINUED | OUTPATIENT
Start: 2017-09-14 | End: 2017-09-14

## 2017-09-14 RX ADMIN — Medication 0.25 MILLIGRAM(S): at 02:09

## 2017-09-14 NOTE — ED ADULT NURSE REASSESSMENT NOTE - NS ED NURSE REASSESS COMMENT FT1
pt and wife made aware of plan to draw second troponin, pt updated on lab results, pt denies chest pain at this time, vss

## 2017-09-14 NOTE — ED PROVIDER NOTE - GASTROINTESTINAL, MLM
Abdomen soft, non-tender, no guarding. Surgical scars on abd have healed. Abdomen soft, non-tender, no guarding. Surgical scars on abd have healed.  rectal exam scant brown stool rectal vault, trace heme +, q/x reactive

## 2017-09-14 NOTE — ED ADULT NURSE NOTE - OBJECTIVE STATEMENT
pt arrives to ED complaining of sob, chest pain, and palpitations. pt states he was sleeping when he woke up abruptly and felt anxious. pt felt associated palpitations, chest pain and sob. pt with history of hemicolectomy with colostomy reversal. denies any cardiac history. on arrival pt diaphoretic, anxious. EKG done. continuous cardiac monitor initiated. PIV started. pt alert and oriented x 4, vss.

## 2017-09-14 NOTE — ED PROVIDER NOTE - OBJECTIVE STATEMENT
46 y/o M with PMHx of perforated diverticulitis in January 2008 and PSHx of cholectomy by Dr. Justice presents to the ED c/o SOB starting about two hours ago. Pt states he woke up out of deep tonight with SOB and diaphoresis. Pt feels like he can't catch his breath. Denies CP. No recent sickness and no recent travel. Non cigarette smoker. +Marijuana use. Pt also was feeling a "bubbling sensation in abd." Pt had hard stool this morning. Pt had BM PTA and noted some blood.

## 2017-09-14 NOTE — ED PROVIDER NOTE - CARE PLAN
Principal Discharge DX:	Dyspnea, unspecified type  Secondary Diagnosis:	Anxiety  Secondary Diagnosis:	Lower GI bleed

## 2018-01-02 ENCOUNTER — APPOINTMENT (OUTPATIENT)
Dept: CARDIOLOGY | Facility: CLINIC | Age: 48
End: 2018-01-02
Payer: COMMERCIAL

## 2018-01-02 ENCOUNTER — NON-APPOINTMENT (OUTPATIENT)
Age: 48
End: 2018-01-02

## 2018-01-02 VITALS
DIASTOLIC BLOOD PRESSURE: 83 MMHG | HEIGHT: 71 IN | HEART RATE: 60 BPM | BODY MASS INDEX: 34.72 KG/M2 | OXYGEN SATURATION: 93 % | WEIGHT: 248 LBS | SYSTOLIC BLOOD PRESSURE: 122 MMHG

## 2018-01-02 DIAGNOSIS — R07.9 CHEST PAIN, UNSPECIFIED: ICD-10-CM

## 2018-01-02 PROCEDURE — 99204 OFFICE O/P NEW MOD 45 MIN: CPT

## 2018-01-02 PROCEDURE — 93000 ELECTROCARDIOGRAM COMPLETE: CPT

## 2018-01-02 RX ORDER — OXYCODONE AND ACETAMINOPHEN 5; 325 MG/1; MG/1
5-325 TABLET ORAL
Qty: 40 | Refills: 0 | Status: DISCONTINUED | COMMUNITY
Start: 2017-01-30 | End: 2018-01-02

## 2018-01-18 ENCOUNTER — APPOINTMENT (OUTPATIENT)
Dept: CARDIOLOGY | Facility: CLINIC | Age: 48
End: 2018-01-18
Payer: COMMERCIAL

## 2018-01-18 PROCEDURE — 93306 TTE W/DOPPLER COMPLETE: CPT

## 2018-04-23 ENCOUNTER — TRANSCRIPTION ENCOUNTER (OUTPATIENT)
Age: 48
End: 2018-04-23

## 2018-07-13 ENCOUNTER — TRANSCRIPTION ENCOUNTER (OUTPATIENT)
Age: 48
End: 2018-07-13

## 2018-08-08 PROBLEM — K57.92 DIVERTICULITIS OF INTESTINE, PART UNSPECIFIED, WITHOUT PERFORATION OR ABSCESS WITHOUT BLEEDING: Chronic | Status: ACTIVE | Noted: 2017-09-14

## 2018-08-17 ENCOUNTER — APPOINTMENT (OUTPATIENT)
Dept: COLORECTAL SURGERY | Facility: CLINIC | Age: 48
End: 2018-08-17
Payer: COMMERCIAL

## 2018-08-17 VITALS
BODY MASS INDEX: 33.6 KG/M2 | HEIGHT: 71 IN | RESPIRATION RATE: 14 BRPM | HEART RATE: 54 BPM | WEIGHT: 240 LBS | DIASTOLIC BLOOD PRESSURE: 78 MMHG | SYSTOLIC BLOOD PRESSURE: 124 MMHG

## 2018-08-17 DIAGNOSIS — R10.32 LEFT LOWER QUADRANT PAIN: ICD-10-CM

## 2018-08-17 PROCEDURE — 46600 DIAGNOSTIC ANOSCOPY SPX: CPT

## 2018-08-17 PROCEDURE — 99214 OFFICE O/P EST MOD 30 MIN: CPT | Mod: 25

## 2018-08-21 ENCOUNTER — OUTPATIENT (OUTPATIENT)
Dept: OUTPATIENT SERVICES | Facility: HOSPITAL | Age: 48
LOS: 1 days | End: 2018-08-21
Payer: COMMERCIAL

## 2018-08-21 ENCOUNTER — APPOINTMENT (OUTPATIENT)
Dept: CT IMAGING | Facility: CLINIC | Age: 48
End: 2018-08-21
Payer: COMMERCIAL

## 2018-08-21 DIAGNOSIS — Z00.8 ENCOUNTER FOR OTHER GENERAL EXAMINATION: ICD-10-CM

## 2018-08-21 PROCEDURE — 74177 CT ABD & PELVIS W/CONTRAST: CPT | Mod: 26

## 2018-08-21 PROCEDURE — 82565 ASSAY OF CREATININE: CPT

## 2018-08-21 PROCEDURE — 74177 CT ABD & PELVIS W/CONTRAST: CPT

## 2018-08-22 ENCOUNTER — OTHER (OUTPATIENT)
Age: 48
End: 2018-08-22

## 2018-08-22 RX ORDER — HYDROCORTISONE 25 MG/G
2.5 CREAM TOPICAL 3 TIMES DAILY
Qty: 1 | Refills: 3 | Status: ACTIVE | COMMUNITY
Start: 2018-08-22 | End: 1900-01-01

## 2018-08-22 RX ORDER — PRAMOXINE HYDROCHLORIDE AND HYDROCORTISONE ACETATE 10; 25 MG/G; MG/G
2.5-1 CREAM TOPICAL
Qty: 1 | Refills: 3 | Status: DISCONTINUED | COMMUNITY
Start: 2018-08-17 | End: 2018-08-22

## 2018-09-05 ENCOUNTER — OTHER (OUTPATIENT)
Age: 48
End: 2018-09-05

## 2018-09-17 ENCOUNTER — APPOINTMENT (OUTPATIENT)
Dept: DERMATOLOGY | Facility: CLINIC | Age: 48
End: 2018-09-17
Payer: COMMERCIAL

## 2018-09-17 VITALS — WEIGHT: 240 LBS | BODY MASS INDEX: 33.6 KG/M2 | HEIGHT: 71 IN

## 2018-09-17 DIAGNOSIS — L82.0 INFLAMED SEBORRHEIC KERATOSIS: ICD-10-CM

## 2018-09-17 DIAGNOSIS — Z80.8 FAMILY HISTORY OF MALIGNANT NEOPLASM OF OTHER ORGANS OR SYSTEMS: ICD-10-CM

## 2018-09-17 DIAGNOSIS — Z86.79 PERSONAL HISTORY OF OTHER DISEASES OF THE CIRCULATORY SYSTEM: ICD-10-CM

## 2018-09-17 PROCEDURE — 99203 OFFICE O/P NEW LOW 30 MIN: CPT | Mod: 25

## 2018-09-17 PROCEDURE — 17110 DESTRUCTION B9 LES UP TO 14: CPT

## 2018-09-17 RX ORDER — ESCITALOPRAM OXALATE 10 MG/1
10 TABLET, FILM COATED ORAL DAILY
Refills: 0 | Status: DISCONTINUED | COMMUNITY
End: 2018-09-17

## 2018-09-17 RX ORDER — PAROXETINE HYDROCHLORIDE 30 MG/1
30 TABLET, FILM COATED ORAL
Refills: 0 | Status: ACTIVE | COMMUNITY

## 2019-02-06 ENCOUNTER — APPOINTMENT (OUTPATIENT)
Dept: CARDIOLOGY | Facility: CLINIC | Age: 49
End: 2019-02-06

## 2019-02-22 ENCOUNTER — INPATIENT (INPATIENT)
Facility: HOSPITAL | Age: 49
LOS: 1 days | Discharge: HOME | End: 2019-02-24
Attending: FAMILY MEDICINE | Admitting: FAMILY MEDICINE
Payer: COMMERCIAL

## 2019-02-22 VITALS — HEIGHT: 71 IN | WEIGHT: 240.08 LBS

## 2019-02-22 LAB
ALBUMIN SERPL ELPH-MCNC: 4.2 G/DL — SIGNIFICANT CHANGE UP (ref 3.3–5)
ALP SERPL-CCNC: 56 U/L — SIGNIFICANT CHANGE UP (ref 40–120)
ALT FLD-CCNC: 39 U/L — SIGNIFICANT CHANGE UP (ref 12–78)
ANION GAP SERPL CALC-SCNC: 5 MMOL/L — SIGNIFICANT CHANGE UP (ref 5–17)
APPEARANCE UR: CLEAR — SIGNIFICANT CHANGE UP
AST SERPL-CCNC: 19 U/L — SIGNIFICANT CHANGE UP (ref 15–37)
BASOPHILS # BLD AUTO: 0.03 K/UL — SIGNIFICANT CHANGE UP (ref 0–0.2)
BASOPHILS NFR BLD AUTO: 0.4 % — SIGNIFICANT CHANGE UP (ref 0–2)
BILIRUB SERPL-MCNC: 0.5 MG/DL — SIGNIFICANT CHANGE UP (ref 0.2–1.2)
BILIRUB UR-MCNC: NEGATIVE — SIGNIFICANT CHANGE UP
BUN SERPL-MCNC: 24 MG/DL — HIGH (ref 7–23)
CALCIUM SERPL-MCNC: 9.3 MG/DL — SIGNIFICANT CHANGE UP (ref 8.5–10.1)
CHLORIDE SERPL-SCNC: 105 MMOL/L — SIGNIFICANT CHANGE UP (ref 96–108)
CO2 SERPL-SCNC: 30 MMOL/L — SIGNIFICANT CHANGE UP (ref 22–31)
COLOR SPEC: YELLOW — SIGNIFICANT CHANGE UP
CREAT SERPL-MCNC: 1.3 MG/DL — SIGNIFICANT CHANGE UP (ref 0.5–1.3)
DIFF PNL FLD: ABNORMAL
EOSINOPHIL # BLD AUTO: 0.09 K/UL — SIGNIFICANT CHANGE UP (ref 0–0.5)
EOSINOPHIL NFR BLD AUTO: 1.1 % — SIGNIFICANT CHANGE UP (ref 0–6)
GLUCOSE SERPL-MCNC: 91 MG/DL — SIGNIFICANT CHANGE UP (ref 70–99)
GLUCOSE UR QL: NEGATIVE MG/DL — SIGNIFICANT CHANGE UP
HCT VFR BLD CALC: 42.2 % — SIGNIFICANT CHANGE UP (ref 39–50)
HGB BLD-MCNC: 14.2 G/DL — SIGNIFICANT CHANGE UP (ref 13–17)
IMM GRANULOCYTES NFR BLD AUTO: 0.1 % — SIGNIFICANT CHANGE UP (ref 0–1.5)
KETONES UR-MCNC: NEGATIVE — SIGNIFICANT CHANGE UP
LEUKOCYTE ESTERASE UR-ACNC: NEGATIVE — SIGNIFICANT CHANGE UP
LYMPHOCYTES # BLD AUTO: 1.6 K/UL — SIGNIFICANT CHANGE UP (ref 1–3.3)
LYMPHOCYTES # BLD AUTO: 20.1 % — SIGNIFICANT CHANGE UP (ref 13–44)
MCHC RBC-ENTMCNC: 31 PG — SIGNIFICANT CHANGE UP (ref 27–34)
MCHC RBC-ENTMCNC: 33.6 GM/DL — SIGNIFICANT CHANGE UP (ref 32–36)
MCV RBC AUTO: 92.1 FL — SIGNIFICANT CHANGE UP (ref 80–100)
MONOCYTES # BLD AUTO: 0.76 K/UL — SIGNIFICANT CHANGE UP (ref 0–0.9)
MONOCYTES NFR BLD AUTO: 9.5 % — SIGNIFICANT CHANGE UP (ref 2–14)
NEUTROPHILS # BLD AUTO: 5.48 K/UL — SIGNIFICANT CHANGE UP (ref 1.8–7.4)
NEUTROPHILS NFR BLD AUTO: 68.8 % — SIGNIFICANT CHANGE UP (ref 43–77)
NITRITE UR-MCNC: NEGATIVE — SIGNIFICANT CHANGE UP
NRBC # BLD: 0 /100 WBCS — SIGNIFICANT CHANGE UP (ref 0–0)
PH UR: 5 — SIGNIFICANT CHANGE UP (ref 5–8)
PLATELET # BLD AUTO: 173 K/UL — SIGNIFICANT CHANGE UP (ref 150–400)
POTASSIUM SERPL-MCNC: 4.4 MMOL/L — SIGNIFICANT CHANGE UP (ref 3.5–5.3)
POTASSIUM SERPL-SCNC: 4.4 MMOL/L — SIGNIFICANT CHANGE UP (ref 3.5–5.3)
PROT SERPL-MCNC: 8.2 GM/DL — SIGNIFICANT CHANGE UP (ref 6–8.3)
PROT UR-MCNC: NEGATIVE MG/DL — SIGNIFICANT CHANGE UP
RBC # BLD: 4.58 M/UL — SIGNIFICANT CHANGE UP (ref 4.2–5.8)
RBC # FLD: 12.1 % — SIGNIFICANT CHANGE UP (ref 10.3–14.5)
SODIUM SERPL-SCNC: 140 MMOL/L — SIGNIFICANT CHANGE UP (ref 135–145)
SP GR SPEC: 1.01 — SIGNIFICANT CHANGE UP (ref 1.01–1.02)
UROBILINOGEN FLD QL: NEGATIVE MG/DL — SIGNIFICANT CHANGE UP
WBC # BLD: 7.97 K/UL — SIGNIFICANT CHANGE UP (ref 3.8–10.5)
WBC # FLD AUTO: 7.97 K/UL — SIGNIFICANT CHANGE UP (ref 3.8–10.5)

## 2019-02-22 PROCEDURE — 52332 CYSTOSCOPY AND TREATMENT: CPT | Mod: RT

## 2019-02-22 PROCEDURE — 99253 IP/OBS CNSLTJ NEW/EST LOW 45: CPT | Mod: 25

## 2019-02-22 PROCEDURE — 99285 EMERGENCY DEPT VISIT HI MDM: CPT

## 2019-02-22 PROCEDURE — 74176 CT ABD & PELVIS W/O CONTRAST: CPT | Mod: 26

## 2019-02-22 PROCEDURE — 93010 ELECTROCARDIOGRAM REPORT: CPT

## 2019-02-22 PROCEDURE — 71046 X-RAY EXAM CHEST 2 VIEWS: CPT | Mod: 26

## 2019-02-22 RX ORDER — ONDANSETRON 8 MG/1
4 TABLET, FILM COATED ORAL ONCE
Qty: 0 | Refills: 0 | Status: DISCONTINUED | OUTPATIENT
Start: 2019-02-22 | End: 2019-02-24

## 2019-02-22 RX ORDER — MORPHINE SULFATE 50 MG/1
4 CAPSULE, EXTENDED RELEASE ORAL ONCE
Qty: 0 | Refills: 0 | Status: DISCONTINUED | OUTPATIENT
Start: 2019-02-22 | End: 2019-02-22

## 2019-02-22 RX ORDER — KETOROLAC TROMETHAMINE 30 MG/ML
30 SYRINGE (ML) INJECTION ONCE
Qty: 0 | Refills: 0 | Status: DISCONTINUED | OUTPATIENT
Start: 2019-02-22 | End: 2019-02-22

## 2019-02-22 RX ORDER — ACETAMINOPHEN 500 MG
650 TABLET ORAL EVERY 6 HOURS
Qty: 0 | Refills: 0 | Status: DISCONTINUED | OUTPATIENT
Start: 2019-02-22 | End: 2019-02-24

## 2019-02-22 RX ORDER — SODIUM CHLORIDE 9 MG/ML
1000 INJECTION INTRAMUSCULAR; INTRAVENOUS; SUBCUTANEOUS ONCE
Qty: 0 | Refills: 0 | Status: COMPLETED | OUTPATIENT
Start: 2019-02-22 | End: 2019-02-22

## 2019-02-22 RX ORDER — ONDANSETRON 8 MG/1
4 TABLET, FILM COATED ORAL ONCE
Qty: 0 | Refills: 0 | Status: COMPLETED | OUTPATIENT
Start: 2019-02-22 | End: 2019-02-22

## 2019-02-22 RX ORDER — MORPHINE SULFATE 50 MG/1
4 CAPSULE, EXTENDED RELEASE ORAL ONCE
Qty: 0 | Refills: 0 | Status: DISCONTINUED | OUTPATIENT
Start: 2019-02-22 | End: 2019-02-23

## 2019-02-22 RX ORDER — HEPARIN SODIUM 5000 [USP'U]/ML
5000 INJECTION INTRAVENOUS; SUBCUTANEOUS EVERY 8 HOURS
Qty: 0 | Refills: 0 | Status: DISCONTINUED | OUTPATIENT
Start: 2019-02-22 | End: 2019-02-24

## 2019-02-22 RX ADMIN — SODIUM CHLORIDE 1000 MILLILITER(S): 9 INJECTION INTRAMUSCULAR; INTRAVENOUS; SUBCUTANEOUS at 18:44

## 2019-02-22 RX ADMIN — MORPHINE SULFATE 4 MILLIGRAM(S): 50 CAPSULE, EXTENDED RELEASE ORAL at 18:42

## 2019-02-22 RX ADMIN — MORPHINE SULFATE 4 MILLIGRAM(S): 50 CAPSULE, EXTENDED RELEASE ORAL at 21:57

## 2019-02-22 RX ADMIN — MORPHINE SULFATE 4 MILLIGRAM(S): 50 CAPSULE, EXTENDED RELEASE ORAL at 21:27

## 2019-02-22 RX ADMIN — Medication 30 MILLIGRAM(S): at 18:58

## 2019-02-22 RX ADMIN — Medication 30 MILLIGRAM(S): at 21:19

## 2019-02-22 RX ADMIN — ONDANSETRON 4 MILLIGRAM(S): 8 TABLET, FILM COATED ORAL at 18:42

## 2019-02-22 NOTE — H&P ADULT - NSHPSOCIALHISTORY_GEN_ALL_CORE
No tob/ETOH/drug use. Pt has a leather/furniture restoration business.  He denies tobacco history.  Reports 2 drinks /week  and occas marijuana.  He denies other drug use.

## 2019-02-22 NOTE — H&P ADULT - HISTORY OF PRESENT ILLNESS
47 y/o male with a PMHx of diverticulitis, HTN presents to the ED c/o waxing and waning flank pain, non radiating. Pt reports he had gradual onset of right flank pain, worsening today. Pt has hx of kidney stones and states the sx feel similar to past episodes, had surgery performed. Pt also reports associated nausea, malodorous urine, urinary urgency and bloody stool. Denies hematuria. Pt believes bloody stools may be related to hemorrhoids. Recently began Flomax 1 week ago. Pt is a  49 y/o male with a PMHx of colostomy and reversal for diverticulitis, HTN, remote uric acid nephrolithiasis  presenting to the ED c/o  intermittent R flank pain.    He reports initially (2 days ago) he had 'gas pain' which was relieved by flatus, then he had gradual increasing right flank pain.  At worst today it was 10/10.   Pt reports 20 yrs ago when he had kidney stones, he had similar symptoms and had surgery with    He  reports associated nausea, malodorous urine, urinary urgency. Denies hematuria. Pt has bloody stools may be related to chronic hemorrhoids. He recently started Flomax 1 week ago. Pt admits he does not drink adequate water .    Past Surg Hx   colostomy and reversal for diverticulitis   nephrostomy 20 years ago by Osito Schultz Hx:  Father 66  of Hemorrhagic CVA, DM  Mother  at 76, Breast ca, stent at age 60  2 Half brothers with Mult Sclerosis

## 2019-02-22 NOTE — H&P ADULT - NSHPPHYSICALEXAM_GEN_ALL_CORE
ICU Vital Signs Last 24 Hrs    T(F): 97.6 (22 Feb 2019 21:24), Max: 98.1 (22 Feb 2019 17:57)  HR: 53 (22 Feb 2019 21:24) (53 - 60)  BP: 124/69 (22 Feb 2019 21:24) (124/69 - 149/89)    RR: 16 (22 Feb 2019 21:24) (16 - 18)  SpO2: 100% (22 Feb 2019 21:24) (100% - 100%)

## 2019-02-22 NOTE — ED STATDOCS - PHYSICAL EXAMINATION
Constitutional: mild distress AAOx3  Eyes: PERRLA EOMI  Head: Normocephalic atraumatic  Mouth: MMM  Cardiac: regular rate   Resp: Lungs CTAB  GI: Abd s/nt/nd  Neuro: CN2-12 intact  Skin: No rashes  Musc: +mild right CVA TTP

## 2019-02-22 NOTE — H&P ADULT - NSHPLABSRESULTS_GEN_ALL_CORE
< from: CT Abdomen and Pelvis No Cont (02.22.19 @ 19:47) >    EXAM:  CT ABDOMEN AND PELVIS                            PROCEDURE DATE:  02/22/2019          INTERPRETATION:  CT ABDOMEN AND PELVIS    HISTORY:  right flank pain    TECHNIQUE: CT images of the abdomen and pelvis are obtained. No contrast   administered. The study is directed at the evaluation of urinary calculi.   Additionally, coronal and sagittal reformatted images are prospectively   created from the volume acquisition. The patient was studied in the   supine position. This study was performed using automatic exposure   control (radiation dose reduction software) to obtain a diagnostic image   quality scan with patient dose as low as reasonably achievable.    COMPARISON: CT abdomen and pelvis 8/21/2018    Perinephric abnormalities: Mild right stranding.  Intrarenal calculi: None.  Renal mass: No soft tissue mass.  Calyces and Collecting systems: Mild right hydronephrosis.  Ureters: 6 mm right upper ureteral calculus.  Urinary Bladder: No bladder calculus.    Other Findings: The lung bases, liver, spleen, pancreas, gallbladder, and   biliary tree are normal. The remainder of the retroperitoneum is normal.   No pelvic abnormalities are seen. No bowel-related abnormality.   Unremarkable rectal sigmoid anastomosis. Normal appendix.    IMPRESSION:     6 mm right upper ureteral calculus causing mild right hydronephrosis.    FELICIANO SCHMIDT   This document has been electronically signed. Feb 22 2019  7:58PM    < end of copied text > < from: CT Abdomen and Pelvis No Cont (02.22.19 @ 19:47) >    EXAM:  CT ABDOMEN AND PELVIS                          PROCEDURE DATE:  02/22/2019      INTERPRETATION:  CT ABDOMEN AND PELVIS    HISTORY:  right flank pain    TECHNIQUE: CT images of the abdomen and pelvis are obtained. No contrast   administered. The study is directed at the evaluation of urinary calculi.   Additionally, coronal and sagittal reformatted images are prospectively   created from the volume acquisition. The patient was studied in the   supine position. This study was performed using automatic exposure   control (radiation dose reduction software) to obtain a diagnostic image   quality scan with patient dose as low as reasonably achievable.    COMPARISON: CT abdomen and pelvis 8/21/2018    Perinephric abnormalities: Mild right stranding.  Intrarenal calculi: None.  Renal mass: No soft tissue mass.  Calyces and Collecting systems: Mild right hydronephrosis.  Ureters: 6 mm right upper ureteral calculus.  Urinary Bladder: No bladder calculus.    Other Findings: The lung bases, liver, spleen, pancreas, gallbladder, and   biliary tree are normal. The remainder of the retroperitoneum is normal.   No pelvic abnormalities are seen. No bowel-related abnormality.   Unremarkable rectal sigmoid anastomosis. Normal appendix.    IMPRESSION:     6 mm right upper ureteral calculus causing mild right hydronephrosis.    FELICIANO SCHMIDT   This document has been electronically signed. Feb 22 2019  7:58PM    < end of copied text >

## 2019-02-22 NOTE — ED STATDOCS - NS_ ATTENDINGSCRIBEDETAILS _ED_A_ED_FT
I, Austen Charles MD,  performed the initial face to face bedside interview with this patient regarding history of present illness, review of symptoms and relevant past medical, social and family history.  I completed an independent physical examination.  I was the initial provider who evaluated this patient.  The history, relevant review of systems, past medical and surgical history, medical decision making, and physical examination was documented by the scribe in my presence and I attest to the accuracy of the documentation.

## 2019-02-22 NOTE — ED STATDOCS - CLINICAL SUMMARY MEDICAL DECISION MAKING FREE TEXT BOX
49 y/o male with hx of HTN, kidney stones, complicated requiring surgery, diverticulitis s/p colostomy with reversal presents with right sided pain radiating to groin for past few days. No fevers but does have urinary urgency. Exam with mild right CVA TTP concern for stone. Will obtain labs, urine, CT, pain control and reassess.

## 2019-02-22 NOTE — ED STATDOCS - OBJECTIVE STATEMENT
49 y/o male with a PMHx of diverticulitis, HTN presents to the ED c/o waxing and waning flank pain, non radiating. Pt reports he had gradual onset of right flank pain, worsening today. Pt has hx of kidney stones and states the sx feel similar to past episodes, had surgery performed. Pt also reports associated nausea, malodorous urine, urinary urgency and bloody stool. Denies hematuria. Pt believes bloody stools may be related to hemorrhoids. Recently began Flomax 1 week ago. Nephrologist Dr. Garcia

## 2019-02-22 NOTE — ED ADULT TRIAGE NOTE - CHIEF COMPLAINT QUOTE
Pt presents today with Rt sided flank pain beginning today, hx of kidney stones. Pt denies hematuria or urinary symptoms at this time. Denies fever chills n/v.

## 2019-02-22 NOTE — ED STATDOCS - PROGRESS NOTE DETAILS
49 yo male with a PMH of htn kidney stones, bph, diverticulitis s/p colostomy and reversal presents with R flank pain for the last few days. Pt states it feels like prior episodes with associated nausea. Feels like he isnt emptying his bladder. Denies v/d/c, dysuria, frequency, f/c/sweating. Labs, CT, UA. -Toy Meraz PA-C 49 yo male with a PMH of htn kidney stones, bph, diverticulitis s/p colostomy and reversal presents with R flank pain for the last few days. Pt states it feels like prior episodes with associated nausea. Feels like he isn't emptying his bladder. Denies v/d/c, dysuria, frequency, f/c/sweating. Labs, CT, UA. -Toy Meraz PA-C signed Fe Starr PA-C Received patient in sign out from STEPHEN Meraz. Pt with 6mm proximal ureter stone, mild hydro, pain starting today. Pt has hx of prior kidney stones requiring intervention years ago with Dr Noel. Dr Noel paged at 8pm and 9pm. Pt still in significant discomfort despite 2 doses of IV morphine and IV toradol. Recommend admission for pain control and urology consult. Pt agrees with admission and plan of care. No apparent infection on UA. Case discussed with and admission accepted by hospitalist Dr. Ruiz. signed Fe Starr PA-C   Spoke to uro Dr Noel regarding pt. He will see in AM as consult. Requests pt be NPO after midnight.

## 2019-02-22 NOTE — ED STATDOCS - NS ED ROS FT
Constitutional: No fever or chills  Eyes: No visual changes  HEENT: No throat pain  CV: No chest pain  Resp: No SOB no cough  GI: No abd pain, nausea or vomiting +bloody stool  : No dysuria +right flank pain +malodorous urine +urinary urgency  MSK: No musculoskeletal pain  Skin: No rash  Neuro: No headache

## 2019-02-22 NOTE — H&P ADULT - NSHPOUTPATIENTPROVIDERS_GEN_ALL_CORE
Nephrologist Dr. Garcia Nephrologist Dr. Noel Nephrologist Dr. Noel  PCP Dr. Samano Yanet [phone]

## 2019-02-22 NOTE — H&P ADULT - ASSESSMENT
Pt is admitted w/    I. R Nephrolithiasis  - IVF 6mm R proximal with hydronephosis  - pain control  - Urology consult    II. HTN    III. DVT prophylaxis    IV. IMPROVE VTE Individual Risk Assessment    RISK                                                                Points    [  ] Previous VTE                                                  3    [  ] Thrombophilia                                               2    [  ] Lower limb paralysis                                      2        (unable to hold up >15 seconds)      [  ] Current Cancer                                              2         (within 6 months)    [  ] Immobilization > 24 hrs                                1    [  ] ICU/CCU stay > 24 hours                              1    [  ] Age > 60                                                      1    IMPROVE VTE Score ____0_____ Pt is admitted w/    I. R Nephrolithiasis  - IVF 6mm R proximal with hydronephosis  - pain control  - Urology consult Dr. Noel  - NPO after midnight    II. HTN    III. DVT prophylaxis    IV. IMPROVE VTE Individual Risk Assessment    RISK                                                                Points    [  ] Previous VTE                                                  3    [  ] Thrombophilia                                               2    [  ] Lower limb paralysis                                      2        (unable to hold up >15 seconds)      [  ] Current Cancer                                              2         (within 6 months)    [  ] Immobilization > 24 hrs                                1    [  ] ICU/CCU stay > 24 hours                              1    [  ] Age > 60                                                      1    IMPROVE VTE Score ____0_____ Pt is a  49 y/o male with a PMHx of partial colostomy and reversal for diverticulitis, HTN, remote uric acid nephrolithiasis  presenting to the ED c/o  intermittent R flank pain.    He reports initially (2 days ago) he had 'gas pain' which was relieved by flatus, then he had gradual increasing right flank pain.  At worst today it was 10/10.   Pt reports 20 yrs ago when he had kidney stones, he had similar symptoms and had surgery with nephrostomy.    He  reports associated nausea, malodorous urine, urinary urgency. Denies hematuria. Pt has bloody stools may be related to chronic hemorrhoids. He recently started Flomax 1 week ago. Pt admits he does not drink adequate water .  Pt is admitted w/    I. R Nephrolithiasis, with hematuria  - IVF 6 mm right upper ureteral calculus with hydronephrosis  - pain control   - Urology consult Dr. Noel  - NPO after midnight, per Dr. Noel    II. HTN, bradycardia to 51 noted on EKG  - cont bystolic at 5mg     III. DVT prophylaxis  - heparin    IV. IMPROVE VTE Individual Risk Assessment    RISK                                                                Points    [  ] Previous VTE                                                  3    [  ] Thrombophilia                                               2    [  ] Lower limb paralysis                                      2        (unable to hold up >15 seconds)      [  ] Current Cancer                                              2         (within 6 months)    [  ] Immobilization > 24 hrs                                1    [  ] ICU/CCU stay > 24 hours                              1    [  ] Age > 60                                                      1    IMPROVE VTE Score ____0_____    V. Hemorrhoids  - supportive care

## 2019-02-23 DIAGNOSIS — N13.2 HYDRONEPHROSIS WITH RENAL AND URETERAL CALCULOUS OBSTRUCTION: ICD-10-CM

## 2019-02-23 LAB
ANION GAP SERPL CALC-SCNC: 6 MMOL/L — SIGNIFICANT CHANGE UP (ref 5–17)
BUN SERPL-MCNC: 23 MG/DL — SIGNIFICANT CHANGE UP (ref 7–23)
CALCIUM SERPL-MCNC: 8.3 MG/DL — LOW (ref 8.5–10.1)
CHLORIDE SERPL-SCNC: 109 MMOL/L — HIGH (ref 96–108)
CO2 SERPL-SCNC: 28 MMOL/L — SIGNIFICANT CHANGE UP (ref 22–31)
CREAT SERPL-MCNC: 1.25 MG/DL — SIGNIFICANT CHANGE UP (ref 0.5–1.3)
CULTURE RESULTS: NO GROWTH — SIGNIFICANT CHANGE UP
GLUCOSE SERPL-MCNC: 93 MG/DL — SIGNIFICANT CHANGE UP (ref 70–99)
POTASSIUM SERPL-MCNC: 4.2 MMOL/L — SIGNIFICANT CHANGE UP (ref 3.5–5.3)
POTASSIUM SERPL-SCNC: 4.2 MMOL/L — SIGNIFICANT CHANGE UP (ref 3.5–5.3)
SODIUM SERPL-SCNC: 143 MMOL/L — SIGNIFICANT CHANGE UP (ref 135–145)
SPECIMEN SOURCE: SIGNIFICANT CHANGE UP

## 2019-02-23 PROCEDURE — 74018 RADEX ABDOMEN 1 VIEW: CPT | Mod: 26

## 2019-02-23 RX ORDER — SODIUM CHLORIDE 9 MG/ML
1000 INJECTION INTRAMUSCULAR; INTRAVENOUS; SUBCUTANEOUS
Qty: 0 | Refills: 0 | Status: DISCONTINUED | OUTPATIENT
Start: 2019-02-23 | End: 2019-02-23

## 2019-02-23 RX ORDER — TAMSULOSIN HYDROCHLORIDE 0.4 MG/1
0.4 CAPSULE ORAL AT BEDTIME
Qty: 0 | Refills: 0 | Status: DISCONTINUED | OUTPATIENT
Start: 2019-02-23 | End: 2019-02-24

## 2019-02-23 RX ORDER — NEBIVOLOL HYDROCHLORIDE 5 MG/1
5 TABLET ORAL DAILY
Qty: 0 | Refills: 0 | Status: DISCONTINUED | OUTPATIENT
Start: 2019-02-23 | End: 2019-02-24

## 2019-02-23 RX ORDER — ALPRAZOLAM 0.25 MG
0.5 TABLET ORAL DAILY
Qty: 0 | Refills: 0 | Status: DISCONTINUED | OUTPATIENT
Start: 2019-02-23 | End: 2019-02-24

## 2019-02-23 RX ORDER — LIDOCAINE 4 G/100G
1 CREAM TOPICAL DAILY
Qty: 0 | Refills: 0 | Status: DISCONTINUED | OUTPATIENT
Start: 2019-02-23 | End: 2019-02-24

## 2019-02-23 RX ORDER — ALPRAZOLAM 0.25 MG
0.5 TABLET ORAL DAILY
Qty: 0 | Refills: 0 | Status: DISCONTINUED | OUTPATIENT
Start: 2019-02-23 | End: 2019-02-23

## 2019-02-23 RX ORDER — FENTANYL CITRATE 50 UG/ML
50 INJECTION INTRAVENOUS
Qty: 0 | Refills: 0 | Status: DISCONTINUED | OUTPATIENT
Start: 2019-02-23 | End: 2019-02-23

## 2019-02-23 RX ORDER — SODIUM CHLORIDE 9 MG/ML
1000 INJECTION INTRAMUSCULAR; INTRAVENOUS; SUBCUTANEOUS
Qty: 0 | Refills: 0 | Status: DISCONTINUED | OUTPATIENT
Start: 2019-02-23 | End: 2019-02-24

## 2019-02-23 RX ORDER — MORPHINE SULFATE 50 MG/1
4 CAPSULE, EXTENDED RELEASE ORAL EVERY 4 HOURS
Qty: 0 | Refills: 0 | Status: DISCONTINUED | OUTPATIENT
Start: 2019-02-23 | End: 2019-02-24

## 2019-02-23 RX ORDER — PANTOPRAZOLE SODIUM 20 MG/1
40 TABLET, DELAYED RELEASE ORAL
Qty: 0 | Refills: 0 | Status: DISCONTINUED | OUTPATIENT
Start: 2019-02-23 | End: 2019-02-24

## 2019-02-23 RX ORDER — PHENAZOPYRIDINE HCL 100 MG
200 TABLET ORAL THREE TIMES A DAY
Qty: 0 | Refills: 0 | Status: DISCONTINUED | OUTPATIENT
Start: 2019-02-23 | End: 2019-02-24

## 2019-02-23 RX ORDER — ONDANSETRON 8 MG/1
4 TABLET, FILM COATED ORAL ONCE
Qty: 0 | Refills: 0 | Status: DISCONTINUED | OUTPATIENT
Start: 2019-02-23 | End: 2019-02-23

## 2019-02-23 RX ORDER — ONDANSETRON 8 MG/1
4 TABLET, FILM COATED ORAL EVERY 6 HOURS
Qty: 0 | Refills: 0 | Status: DISCONTINUED | OUTPATIENT
Start: 2019-02-23 | End: 2019-02-24

## 2019-02-23 RX ADMIN — FENTANYL CITRATE 50 MICROGRAM(S): 50 INJECTION INTRAVENOUS at 18:30

## 2019-02-23 RX ADMIN — SODIUM CHLORIDE 75 MILLILITER(S): 9 INJECTION INTRAMUSCULAR; INTRAVENOUS; SUBCUTANEOUS at 18:49

## 2019-02-23 RX ADMIN — Medication 200 MILLIGRAM(S): at 18:25

## 2019-02-23 RX ADMIN — MORPHINE SULFATE 4 MILLIGRAM(S): 50 CAPSULE, EXTENDED RELEASE ORAL at 09:02

## 2019-02-23 RX ADMIN — FENTANYL CITRATE 50 MICROGRAM(S): 50 INJECTION INTRAVENOUS at 18:13

## 2019-02-23 RX ADMIN — FENTANYL CITRATE 50 MICROGRAM(S): 50 INJECTION INTRAVENOUS at 18:23

## 2019-02-23 RX ADMIN — Medication 650 MILLIGRAM(S): at 13:18

## 2019-02-23 RX ADMIN — MORPHINE SULFATE 4 MILLIGRAM(S): 50 CAPSULE, EXTENDED RELEASE ORAL at 04:34

## 2019-02-23 RX ADMIN — MORPHINE SULFATE 4 MILLIGRAM(S): 50 CAPSULE, EXTENDED RELEASE ORAL at 13:34

## 2019-02-23 RX ADMIN — MORPHINE SULFATE 4 MILLIGRAM(S): 50 CAPSULE, EXTENDED RELEASE ORAL at 13:18

## 2019-02-23 RX ADMIN — Medication 650 MILLIGRAM(S): at 22:50

## 2019-02-23 RX ADMIN — Medication 650 MILLIGRAM(S): at 13:34

## 2019-02-23 RX ADMIN — SODIUM CHLORIDE 250 MILLILITER(S): 9 INJECTION INTRAMUSCULAR; INTRAVENOUS; SUBCUTANEOUS at 00:11

## 2019-02-23 RX ADMIN — HEPARIN SODIUM 5000 UNIT(S): 5000 INJECTION INTRAVENOUS; SUBCUTANEOUS at 22:13

## 2019-02-23 RX ADMIN — MORPHINE SULFATE 4 MILLIGRAM(S): 50 CAPSULE, EXTENDED RELEASE ORAL at 05:27

## 2019-02-23 RX ADMIN — TAMSULOSIN HYDROCHLORIDE 0.4 MILLIGRAM(S): 0.4 CAPSULE ORAL at 01:55

## 2019-02-23 RX ADMIN — HEPARIN SODIUM 5000 UNIT(S): 5000 INJECTION INTRAVENOUS; SUBCUTANEOUS at 05:26

## 2019-02-23 RX ADMIN — Medication 30 MILLIGRAM(S): at 01:55

## 2019-02-23 RX ADMIN — MORPHINE SULFATE 4 MILLIGRAM(S): 50 CAPSULE, EXTENDED RELEASE ORAL at 00:11

## 2019-02-23 RX ADMIN — Medication 650 MILLIGRAM(S): at 22:20

## 2019-02-23 RX ADMIN — TAMSULOSIN HYDROCHLORIDE 0.4 MILLIGRAM(S): 0.4 CAPSULE ORAL at 22:13

## 2019-02-23 RX ADMIN — SODIUM CHLORIDE 75 MILLILITER(S): 9 INJECTION INTRAMUSCULAR; INTRAVENOUS; SUBCUTANEOUS at 09:03

## 2019-02-23 RX ADMIN — MORPHINE SULFATE 4 MILLIGRAM(S): 50 CAPSULE, EXTENDED RELEASE ORAL at 09:15

## 2019-02-23 NOTE — PROGRESS NOTE ADULT - ASSESSMENT
*R Nephrolithiasis with hematuria  - CT 6 mm right upper ureteral calculus with hydronephrosis  - pain control   - Urology consult Dr. Noel  - NPO after midnight, per Dr. Noel    *HTN  *Sinus bradycardia   - cont bystolic at 5mg     *DVT prophylaxis  - heparin *R Nephrolithiasis with hematuria  - CT 6 mm right upper ureteral calculus with hydronephrosis  - pain control   - Urology consult appreciated   - NPO  patient going for stent today    *HTN  *Sinus bradycardia   - cont bystolic at 5mg     *DVT prophylaxis  - heparin

## 2019-02-23 NOTE — BRIEF OPERATIVE NOTE - PROCEDURE
<<-----Click on this checkbox to enter Procedure Cystoscopic insertion of right ureteral stent  02/23/2019    Active  CRISTINA

## 2019-02-23 NOTE — PROGRESS NOTE ADULT - SUBJECTIVE AND OBJECTIVE BOX
HOSPITALIST PROGRESS NOTE:  SUBJECTIVE:  PCP:  Yanet Bran [phone]    Chief Complaint: Patient is a 48y old  Male who presents with a chief complaint of Nephrolithiasis (2019 22:17)    HPI:  Pt is a  47 y/o male with a PMHx of colostomy and reversal for diverticulitis, HTN, remote uric acid nephrolithiasis  presenting to the ED c/o  intermittent R flank pain.    He reports initially (2 days ago) he had 'gas pain' which was relieved by flatus, then he had gradual increasing right flank pain.  At worst today it was 10/10.  Pt reports 20 yrs ago when he had kidney stones, he had similar symptoms and had surgery with  He  reports associated nausea, malodorous urine, urinary urgency. Denies hematuria. Pt has bloody stools may be related to chronic hemorrhoids. He recently started Flomax 1 week ago. Pt admits he does not drink adequate water .    : Above Reviewed.     Allergies:  No Known Allergies    REVIEW OF SYSTEMS:  See HPI. All other review of systems is negative unless indicated above.     OBJECTIVE  Physical Exam:  Vital Signs:  Height (cm): 180.34 ( @ 17:17)  Weight (kg): 108.9 ( @ 17:17)  BMI (kg/m2): 33.5 ( @ 17:17)  BSA (m2): 2.28 ( @ 17:17)  Vital Signs Last 24 Hrs  T(C): 36.4 (2019 04:28), Max: 36.7 (2019 17:57)  T(F): 97.6 (2019 04:28), Max: 98.1 (2019 17:57)  HR: 56 (2019 04:28) (53 - 60)  BP: 111/63 (2019 04:28) (107/60 - 149/89)  BP(mean): --  RR: 16 (2019 04:28) (16 - 18)  SpO2: 97% (2019 04:28) (97% - 100%)  I&O's Summary      Constitutional: NAD, awake and alert, well-developed  Neurological: AAO x 3, no focal deficits  HEENT: PERRLA, EOMI, MMM  Neck: Soft and supple, No LAD, No JVD  Respiratory: Breath sounds are clear bilaterally, No wheezing, rales or rhonchi  Cardiovascular: S1 and S2, regular rate and rhythm; no Murmurs, gallops or rubs  Gastrointestinal: Bowel Sounds present, soft, nontender, nondistended, no guarding, no rebound tenderness  Back: No CVA tenderness   Extremities: No peripheral edema  Vascular: 2+ peripheral pulses  Musculoskeletal: 5/5 strength b/l upper and lower extremities  Skin: No rashes  Breast: Deferred  Rectal: Deferred    MEDICATIONS  (STANDING):  heparin  Injectable 5000 Unit(s) SubCutaneous every 8 hours  lidocaine   Patch 1 Patch Transdermal daily  nebivolol 5 milliGRAM(s) Oral daily  pantoprazole    Tablet 40 milliGRAM(s) Oral before breakfast  PARoxetine 30 milliGRAM(s) Oral daily  tamsulosin 0.4 milliGRAM(s) Oral at bedtime      LABS: All Labs Reviewed:                        14.2   7.97  )-----------( 173      ( 2019 18:39 )             42.2         143  |  109<H>  |  23  ----------------------------<  93  4.2   |  28  |  1.25    Ca    8.3<L>      2019 07:03    TPro  8.2  /  Alb  4.2  /  TBili  0.5  /  DBili  x   /  AST  19  /  ALT  39  /  AlkPhos  56        Urinalysis Basic - ( 2019 18:39 )    Color: Yellow / Appearance: Clear / S.015 / pH: x  Gluc: x / Ketone: Negative  / Bili: Negative / Urobili: Negative mg/dL   Blood: x / Protein: Negative mg/dL / Nitrite: Negative   Leuk Esterase: Negative / RBC: 25-50 /HPF / WBC 0-2   Sq Epi: x / Non Sq Epi: Occasional / Bacteria: Occasional    RADIOLOGY/EKG:    < from: CT Abdomen and Pelvis No Cont (19 @ 19:47) >    IMPRESSION:     6 mm right upper ureteral calculus causing mild right hydronephrosis.      < end of copied text > HOSPITALIST PROGRESS NOTE:  SUBJECTIVE:  PCP:  Dr. Samano Yanet [phone]    Chief Complaint: Patient is a 48y old  Male who presents with a chief complaint of Nephrolithiasis (2019 22:17)    HPI:  Pt is a  49 y/o male with a PMHx of colostomy and reversal for diverticulitis, HTN, remote uric acid nephrolithiasis  presenting to the ED c/o  intermittent R flank pain.    He reports initially (2 days ago) he had 'gas pain' which was relieved by flatus, then he had gradual increasing right flank pain.  At worst today it was 10/10.  Pt reports 20 yrs ago when he had kidney stones, he had similar symptoms and had surgery with  He  reports associated nausea, malodorous urine, urinary urgency. Denies hematuria. Pt has bloody stools may be related to chronic hemorrhoids. He recently started Flomax 1 week ago. Pt admits he does not drink adequate water .    : Above Reviewed.  Patient continues to have pain. No n/v    Allergies:  No Known Allergies    REVIEW OF SYSTEMS:  See HPI. All other review of systems is negative unless indicated above.     OBJECTIVE  Physical Exam:  Vital Signs Last 24 Hrs  T(C): 36.9 (2019 13:12), Max: 36.9 (2019 11:21)  T(F): 98.4 (2019 13:12), Max: 98.5 (2019 11:21)  HR: 52 (2019 13:12) (50 - 60)  BP: 119/71 (2019 13:12) (107/60 - 149/89)  BP(mean): --  RR: 17 (2019 13:12) (16 - 18)  SpO2: 98% (2019 13:12) (97% - 100%)    Constitutional: NAD, awake and alert, well-developed  Neurological: AAO x 3, no focal deficits  HEENT: PERRLA, EOMI, MMM  Neck: Soft and supple, No LAD, No JVD  Respiratory: Breath sounds are clear bilaterally, No wheezing, rales or rhonchi  Cardiovascular: S1 and S2, regular rate and rhythm; no Murmurs, gallops or rubs  Gastrointestinal: Bowel Sounds present, soft, nontender, nondistended, no guarding, no rebound tenderness  Back: No CVA tenderness   Extremities: No peripheral edema  Vascular: 2+ peripheral pulses  Musculoskeletal: 5/5 strength b/l upper and lower extremities  Skin: No rashes  Breast: Deferred  Rectal: Deferred    MEDICATIONS  (STANDING):  heparin  Injectable 5000 Unit(s) SubCutaneous every 8 hours  lidocaine   Patch 1 Patch Transdermal daily  nebivolol 5 milliGRAM(s) Oral daily  pantoprazole    Tablet 40 milliGRAM(s) Oral before breakfast  PARoxetine 30 milliGRAM(s) Oral daily  tamsulosin 0.4 milliGRAM(s) Oral at bedtime    Lab Results:  CBC  CBC Full  -  ( 2019 18:39 )  WBC Count : 7.97 K/uL  Hemoglobin : 14.2 g/dL  Hematocrit : 42.2 %  Platelet Count - Automated : 173 K/uL  Mean Cell Volume : 92.1 fl  Mean Cell Hemoglobin : 31.0 pg  Mean Cell Hemoglobin Concentration : 33.6 gm/dL  Auto Neutrophil # : 5.48 K/uL  Auto Lymphocyte # : 1.60 K/uL  Auto Monocyte # : 0.76 K/uL  Auto Eosinophil # : 0.09 K/uL  Auto Basophil # : 0.03 K/uL  Auto Neutrophil % : 68.8 %  Auto Lymphocyte % : 20.1 %  Auto Monocyte % : 9.5 %  Auto Eosinophil % : 1.1 %  Auto Basophil % : 0.4 %    .		Differential:	[] Automated		[] Manual  Chemistry                        14.2   7.97  )-----------( 173      ( 2019 18:39 )             42.2     02-23    143  |  109<H>  |  23  ----------------------------<  93  4.2   |  28  |  1.25    Ca    8.3<L>      2019 07:03    TPro  8.2  /  Alb  4.2  /  TBili  0.5  /  DBili  x   /  AST  19  /  ALT  39  /  AlkPhos  56  02-    LIVER FUNCTIONS - ( 2019 18:39 )  Alb: 4.2 g/dL / Pro: 8.2 gm/dL / ALK PHOS: 56 U/L / ALT: 39 U/L / AST: 19 U/L / GGT: x             Urinalysis Basic - ( 2019 18:39 )    Color: Yellow / Appearance: Clear / S.015 / pH: x  Gluc: x / Ketone: Negative  / Bili: Negative / Urobili: Negative mg/dL   Blood: x / Protein: Negative mg/dL / Nitrite: Negative   Leuk Esterase: Negative / RBC: 25-50 /HPF / WBC 0-2   Sq Epi: x / Non Sq Epi: Occasional / Bacteria: Occasional        RADIOLOGY/EKG:    < from: CT Abdomen and Pelvis No Cont (19 @ 19:47) >    IMPRESSION:     6 mm right upper ureteral calculus causing mild right hydronephrosis.      < end of copied text >

## 2019-02-23 NOTE — CONSULT NOTE ADULT - PROBLEM SELECTOR RECOMMENDATION 9
All options gone over and complicatons as well with patient and wife.  Stent placement with later treatment recommended which may include ESWL, ureteroscopy, or dissolution therapy.  Nephrostomy may be indicated if stent placement not successful.

## 2019-02-24 ENCOUNTER — TRANSCRIPTION ENCOUNTER (OUTPATIENT)
Age: 49
End: 2019-02-24

## 2019-02-24 VITALS — HEART RATE: 55 BPM

## 2019-02-24 RX ORDER — PHENAZOPYRIDINE HCL 100 MG
1 TABLET ORAL
Qty: 45 | Refills: 0 | OUTPATIENT
Start: 2019-02-24 | End: 2019-03-10

## 2019-02-24 RX ADMIN — PANTOPRAZOLE SODIUM 40 MILLIGRAM(S): 20 TABLET, DELAYED RELEASE ORAL at 05:02

## 2019-02-24 RX ADMIN — HEPARIN SODIUM 5000 UNIT(S): 5000 INJECTION INTRAVENOUS; SUBCUTANEOUS at 05:02

## 2019-02-24 RX ADMIN — Medication 200 MILLIGRAM(S): at 05:02

## 2019-02-24 NOTE — DISCHARGE NOTE ADULT - CARE PROVIDER_API CALL
Amilcar Noel)  Urology  [address] [facility] [facility], [address] [place] [address]  Phone: [phone]  Fax: [phone]  Follow Up Time:     FU reyes PCP,   Phone: (   )    -  Fax: (   )    -  Follow Up Time:

## 2019-02-24 NOTE — DISCHARGE NOTE ADULT - PROVIDER TOKENS
PROVIDER:[TOKEN:[[id number]]],FREE:[LAST:[MIGUEL chambers PCP],PHONE:[(   )    -],FAX:[(   )    -]]

## 2019-02-24 NOTE — DISCHARGE NOTE ADULT - OTHER SIGNIFICANT FINDINGS
Lab Results:  CBC  CBC Full  -  ( 2019 18:39 )  WBC Count : 7.97 K/uL  Hemoglobin : 14.2 g/dL  Hematocrit : 42.2 %  Platelet Count - Automated : 173 K/uL  Mean Cell Volume : 92.1 fl  Mean Cell Hemoglobin : 31.0 pg  Mean Cell Hemoglobin Concentration : 33.6 gm/dL  Auto Neutrophil # : 5.48 K/uL  Auto Lymphocyte # : 1.60 K/uL  Auto Monocyte # : 0.76 K/uL  Auto Eosinophil # : 0.09 K/uL  Auto Basophil # : 0.03 K/uL  Auto Neutrophil % : 68.8 %  Auto Lymphocyte % : 20.1 %  Auto Monocyte % : 9.5 %  Auto Eosinophil % : 1.1 %  Auto Basophil % : 0.4 %    .		Differential:	[] Automated		[] Manual  Chemistry                        14.2   7.97  )-----------( 173      ( 2019 18:39 )             42.2     02-    143  |  109<H>  |  23  ----------------------------<  93  4.2   |  28  |  1.25    Ca    8.3<L>      2019 07:03    TPro  8.2  /  Alb  4.2  /  TBili  0.5  /  DBili  x   /  AST  19  /  ALT  39  /  AlkPhos  56  02-    LIVER FUNCTIONS - ( 2019 18:39 )  Alb: 4.2 g/dL / Pro: 8.2 gm/dL / ALK PHOS: 56 U/L / ALT: 39 U/L / AST: 19 U/L / GGT: x             Urinalysis Basic - ( 2019 18:39 )    Color: Yellow / Appearance: Clear / S.015 / pH: x  Gluc: x / Ketone: Negative  / Bili: Negative / Urobili: Negative mg/dL   Blood: x / Protein: Negative mg/dL / Nitrite: Negative   Leuk Esterase: Negative / RBC: 25-50 /HPF / WBC 0-2   Sq Epi: x / Non Sq Epi: Occasional / Bacteria: Occasional        RADIOLOGY/EKG:    < from: CT Abdomen and Pelvis No Cont (19 @ 19:47) >    IMPRESSION:     6 mm right upper ureteral calculus causing mild right hydronephrosis.      < end of copied text >

## 2019-02-24 NOTE — DISCHARGE NOTE ADULT - CARE PLAN
Principal Discharge DX:	Ureteral stone with hydronephrosis  Goal:	R Nephrolithiasis with hematuria  Assessment and plan of treatment:	- CT 6 mm right upper ureteral calculus with hydronephrosis  S/P Cystoscopic insertion of right ureteral stent  02/23/2019   -continue pyridium for burning and FU with Dr Noel next week

## 2019-02-24 NOTE — DISCHARGE NOTE ADULT - PLAN OF CARE
R Nephrolithiasis with hematuria - CT 6 mm right upper ureteral calculus with hydronephrosis  S/P Cystoscopic insertion of right ureteral stent  02/23/2019   -continue pyridium for burning and FU with Dr Noel next week

## 2019-02-24 NOTE — DISCHARGE NOTE ADULT - CARE PROVIDERS DIRECT ADDRESSES
[email or web address]

## 2019-02-24 NOTE — DISCHARGE NOTE ADULT - HOSPITAL COURSE
HOSPITALIST PROGRESS NOTE:  SUBJECTIVE:  PCP:  Yanet Bran [phone]    Chief Complaint: Patient is a 48y old  Male who presents with a chief complaint of Nephrolithiasis (22 Feb 2019 22:17)    HPI:  Pt is a  47 y/o male with a PMHx of colostomy and reversal for diverticulitis, HTN, remote uric acid nephrolithiasis  presenting to the ED c/o  intermittent R flank pain.    He reports initially (2 days ago) he had 'gas pain' which was relieved by flatus, then he had gradual increasing right flank pain.  At worst today it was 10/10.  Pt reports 20 yrs ago when he had kidney stones, he had similar symptoms and had surgery with  He  reports associated nausea, malodorous urine, urinary urgency. Denies hematuria. Pt has bloody stools may be related to chronic hemorrhoids. He recently started Flomax 1 week ago. Pt admits he does not drink adequate water .    2/23: Above Reviewed.  Patient continues to have pain. No n/v  2/24:  Patient has no complaints. Voiding and its clear. No pain      *R Nephrolithiasis with hematuria  - CT 6 mm right upper ureteral calculus with hydronephrosis  - pain control   - Urology consult appreciated   - S/P Cystoscopic insertion of right ureteral stent  02/23/2019     *HTN  *Sinus bradycardia   - cont bystolic at 5mg     *DVT prophylaxis  - heparin    total time: 45 minutes

## 2019-02-24 NOTE — DISCHARGE NOTE ADULT - PATIENT PORTAL LINK FT
You can access the [facility] Patient Portal, offered by [facility], by registering with the following website: [email or web address]

## 2019-02-24 NOTE — PROGRESS NOTE ADULT - SUBJECTIVE AND OBJECTIVE BOX
HOSPITALIST PROGRESS NOTE:  SUBJECTIVE:  PCP:  Dr. Samano Yanet [phone]    Chief Complaint: Patient is a 48y old  Male who presents with a chief complaint of Nephrolithiasis (2019 22:17)    HPI:  Pt is a  47 y/o male with a PMHx of colostomy and reversal for diverticulitis, HTN, remote uric acid nephrolithiasis  presenting to the ED c/o  intermittent R flank pain.    He reports initially (2 days ago) he had 'gas pain' which was relieved by flatus, then he had gradual increasing right flank pain.  At worst today it was 10/10.  Pt reports 20 yrs ago when he had kidney stones, he had similar symptoms and had surgery with  He  reports associated nausea, malodorous urine, urinary urgency. Denies hematuria. Pt has bloody stools may be related to chronic hemorrhoids. He recently started Flomax 1 week ago. Pt admits he does not drink adequate water .    : Above Reviewed.  Patient continues to have pain. No n/v  :  Patient has no complaints. Voiding and its clear. No pain    Allergies:  No Known Allergies    REVIEW OF SYSTEMS:  See HPI. All other review of systems is negative unless indicated above.     OBJECTIVE  Physical Exam:  Vital Signs Last 24 Hrs  T(C): 36.4 (2019 04:55), Max: 36.9 (2019 11:21)  T(F): 97.6 (2019 04:55), Max: 98.5 (2019 11:21)  HR: 55 (2019 04:59) (52 - 101)  BP: 128/79 (2019 04:55) (110/88 - 149/118)  BP(mean): --  RR: 16 (2019 04:55) (12 - 18)  SpO2: 98% (2019 04:55) (95% - 98%)    Constitutional: NAD, awake and alert, well-developed  Neurological: AAO x 3, no focal deficits  HEENT: PERRLA, EOMI, MMM  Neck: Soft and supple, No LAD, No JVD  Respiratory: Breath sounds are clear bilaterally, No wheezing, rales or rhonchi  Cardiovascular: S1 and S2, regular rate and rhythm; no Murmurs, gallops or rubs  Gastrointestinal: Bowel Sounds present, soft, nontender, nondistended, no guarding, no rebound tenderness  Back: No CVA tenderness   Extremities: No peripheral edema  Vascular: 2+ peripheral pulses  Musculoskeletal: 5/5 strength b/l upper and lower extremities  Skin: No rashes  Breast: Deferred  Rectal: Deferred    MEDICATIONS  (STANDING):  heparin  Injectable 5000 Unit(s) SubCutaneous every 8 hours  lidocaine   Patch 1 Patch Transdermal daily  nebivolol 5 milliGRAM(s) Oral daily  pantoprazole    Tablet 40 milliGRAM(s) Oral before breakfast  PARoxetine 30 milliGRAM(s) Oral daily  tamsulosin 0.4 milliGRAM(s) Oral at bedtime    Lab Results:  CBC  CBC Full  -  ( 2019 18:39 )  WBC Count : 7.97 K/uL  Hemoglobin : 14.2 g/dL  Hematocrit : 42.2 %  Platelet Count - Automated : 173 K/uL  Mean Cell Volume : 92.1 fl  Mean Cell Hemoglobin : 31.0 pg  Mean Cell Hemoglobin Concentration : 33.6 gm/dL  Auto Neutrophil # : 5.48 K/uL  Auto Lymphocyte # : 1.60 K/uL  Auto Monocyte # : 0.76 K/uL  Auto Eosinophil # : 0.09 K/uL  Auto Basophil # : 0.03 K/uL  Auto Neutrophil % : 68.8 %  Auto Lymphocyte % : 20.1 %  Auto Monocyte % : 9.5 %  Auto Eosinophil % : 1.1 %  Auto Basophil % : 0.4 %    .		Differential:	[] Automated		[] Manual  Chemistry                        14.2   7.97  )-----------( 173      ( 2019 18:39 )             42.2     02-    143  |  109<H>  |  23  ----------------------------<  93  4.2   |  28  |  1.25    Ca    8.3<L>      2019 07:03    TPro  8.2  /  Alb  4.2  /  TBili  0.5  /  DBili  x   /  AST  19  /  ALT  39  /  AlkPhos  56  02-    LIVER FUNCTIONS - ( 2019 18:39 )  Alb: 4.2 g/dL / Pro: 8.2 gm/dL / ALK PHOS: 56 U/L / ALT: 39 U/L / AST: 19 U/L / GGT: x             Urinalysis Basic - ( 2019 18:39 )    Color: Yellow / Appearance: Clear / S.015 / pH: x  Gluc: x / Ketone: Negative  / Bili: Negative / Urobili: Negative mg/dL   Blood: x / Protein: Negative mg/dL / Nitrite: Negative   Leuk Esterase: Negative / RBC: 25-50 /HPF / WBC 0-2   Sq Epi: x / Non Sq Epi: Occasional / Bacteria: Occasional        RADIOLOGY/EKG:    < from: CT Abdomen and Pelvis No Cont (19 @ 19:47) >    IMPRESSION:     6 mm right upper ureteral calculus causing mild right hydronephrosis.      < end of copied text >

## 2019-02-24 NOTE — PROGRESS NOTE ADULT - ASSESSMENT
*R Nephrolithiasis with hematuria  - CT 6 mm right upper ureteral calculus with hydronephrosis  - pain control   - Urology consult appreciated   - S/P Cystoscopic insertion of right ureteral stent  02/23/2019     *HTN  *Sinus bradycardia   - cont bystolic at 5mg     *DVT prophylaxis  - heparin

## 2019-02-24 NOTE — DISCHARGE NOTE ADULT - MEDICATION SUMMARY - MEDICATIONS TO TAKE
I will START or STAY ON the medications listed below when I get home from the hospital:    dutasteride-tamsulosin 0.5 mg-0.4 mg oral capsule  -- 1 cap(s) by mouth once a day  -- Indication: For BPH    PARoxetine 30 mg oral tablet  -- 1 tab(s) by mouth once a day  -- Indication: For deopression/Anxiety    ALPRAZolam 0.5 mg oral tablet  -- 1 tab(s) by mouth once a day, As Needed - for anxiety  -- Indication: For deopression/Anxiety    Bystolic 10 mg oral tablet  -- 1 tab(s) by mouth once a day  -- Indication: For HTN (hypertension)    Proctozone HC 2.5% rectal cream with applicator  -- 1 application rectally 2 times a day, As Needed - hemmroids  -- Indication: For Hemorrhoids    phenazopyridine 200 mg oral tablet  -- 1 tab(s) by mouth 3 times a day  -- Indication: For Ureteral stone with hydronephrosis    pantoprazole 40 mg oral delayed release tablet  -- 1 tab(s) by mouth once a day  -- Indication: For gerd    Centrum oral tablet  -- 1 tab(s) by mouth once a day  -- Indication: For vitamin

## 2019-02-25 PROBLEM — I10 ESSENTIAL (PRIMARY) HYPERTENSION: Chronic | Status: ACTIVE | Noted: 2019-02-22

## 2019-02-25 PROBLEM — N20.0 CALCULUS OF KIDNEY: Chronic | Status: ACTIVE | Noted: 2019-02-22

## 2019-02-25 PROBLEM — N20.1 CALCIUM URETEROLITHIASIS: Status: ACTIVE | Noted: 2019-02-25

## 2019-02-26 ENCOUNTER — APPOINTMENT (OUTPATIENT)
Age: 49
End: 2019-02-26
Payer: COMMERCIAL

## 2019-02-26 VITALS
SYSTOLIC BLOOD PRESSURE: 122 MMHG | TEMPERATURE: 98.1 F | HEART RATE: 63 BPM | BODY MASS INDEX: 34.3 KG/M2 | RESPIRATION RATE: 16 BRPM | DIASTOLIC BLOOD PRESSURE: 77 MMHG | HEIGHT: 71 IN | WEIGHT: 245 LBS | OXYGEN SATURATION: 95 %

## 2019-02-26 DIAGNOSIS — R35.0 FREQUENCY OF MICTURITION: ICD-10-CM

## 2019-02-26 DIAGNOSIS — N20.1 CALCULUS OF URETER: ICD-10-CM

## 2019-02-26 DIAGNOSIS — Z87.19 PERSONAL HISTORY OF OTHER DISEASES OF THE DIGESTIVE SYSTEM: ICD-10-CM

## 2019-02-26 PROCEDURE — 99214 OFFICE O/P EST MOD 30 MIN: CPT

## 2019-02-26 NOTE — END OF VISIT
[FreeTextEntry3] : I will prescribe stone prevention therapy for him and he is going to have his stone removed by one of my associates. Following this he will follow here again for a transrectal ultrasound prostate gland in a review of the prostate therapy. A PSA will be checked at that time  he will follow up in one year with radial labs and a renal sonogram.

## 2019-02-26 NOTE — HISTORY OF PRESENT ILLNESS
[FreeTextEntry1] : This patient is here following placement of ureteral stent for a mid ureteral stone on the right side. He has had stones in the past. He is also having some lower tract symptoms. At the moment he is on a combination product of tamsulosin and finasteride.

## 2019-02-26 NOTE — PHYSICAL EXAM
[General Appearance - Well Developed] : well developed [General Appearance - Well Nourished] : well nourished [Normal Appearance] : normal appearance [Well Groomed] : well groomed [General Appearance - In No Acute Distress] : no acute distress [Abdomen Soft] : soft [Abdomen Tenderness] : non-tender [Costovertebral Angle Tenderness] : no ~M costovertebral angle tenderness [Urethral Meatus] : meatus normal [Urinary Bladder Findings] : the bladder was normal on palpation [Scrotum] : the scrotum was normal [Testes Mass (___cm)] : there were no testicular masses [No Prostate Nodules] : no prostate nodules [FreeTextEntry1] : The prostate is small to medium size and palpably benign [Edema] : no peripheral edema [] : no respiratory distress [Respiration, Rhythm And Depth] : normal respiratory rhythm and effort [Exaggerated Use Of Accessory Muscles For Inspiration] : no accessory muscle use [Oriented To Time, Place, And Person] : oriented to person, place, and time [Affect] : the affect was normal [Mood] : the mood was normal [Not Anxious] : not anxious [Normal Station and Gait] : the gait and station were normal for the patient's age [No Focal Deficits] : no focal deficits [No Palpable Adenopathy] : no palpable adenopathy

## 2019-02-26 NOTE — REVIEW OF SYSTEMS
[see HPI] : see HPI [History of kidney stones] : history of kidney stones [Strong urge to urinate] : strong urge to urinate [Anxiety] : anxiety [Negative] : Heme/Lymph

## 2019-02-27 DIAGNOSIS — N13.2 HYDRONEPHROSIS WITH RENAL AND URETERAL CALCULOUS OBSTRUCTION: ICD-10-CM

## 2019-02-27 DIAGNOSIS — Z87.19 PERSONAL HISTORY OF OTHER DISEASES OF THE DIGESTIVE SYSTEM: ICD-10-CM

## 2019-02-27 DIAGNOSIS — I10 ESSENTIAL (PRIMARY) HYPERTENSION: ICD-10-CM

## 2019-02-27 DIAGNOSIS — R31.9 HEMATURIA, UNSPECIFIED: ICD-10-CM

## 2019-02-27 DIAGNOSIS — R00.1 BRADYCARDIA, UNSPECIFIED: ICD-10-CM

## 2019-02-28 ENCOUNTER — EMERGENCY (EMERGENCY)
Facility: HOSPITAL | Age: 49
LOS: 0 days | Discharge: HOME | End: 2019-02-28
Attending: EMERGENCY MEDICINE | Admitting: EMERGENCY MEDICINE
Payer: COMMERCIAL

## 2019-02-28 VITALS — HEIGHT: 71 IN | WEIGHT: 244.93 LBS

## 2019-02-28 VITALS
TEMPERATURE: 98 F | OXYGEN SATURATION: 97 % | RESPIRATION RATE: 16 BRPM | DIASTOLIC BLOOD PRESSURE: 68 MMHG | HEART RATE: 68 BPM | SYSTOLIC BLOOD PRESSURE: 116 MMHG

## 2019-02-28 DIAGNOSIS — R30.0 DYSURIA: ICD-10-CM

## 2019-02-28 DIAGNOSIS — Z87.442 PERSONAL HISTORY OF URINARY CALCULI: ICD-10-CM

## 2019-02-28 DIAGNOSIS — I10 ESSENTIAL (PRIMARY) HYPERTENSION: ICD-10-CM

## 2019-02-28 DIAGNOSIS — N20.0 CALCULUS OF KIDNEY: ICD-10-CM

## 2019-02-28 LAB
ALBUMIN SERPL ELPH-MCNC: 4 G/DL — SIGNIFICANT CHANGE UP (ref 3.3–5)
ALP SERPL-CCNC: 62 U/L — SIGNIFICANT CHANGE UP (ref 40–120)
ALT FLD-CCNC: 61 U/L — SIGNIFICANT CHANGE UP (ref 12–78)
ANION GAP SERPL CALC-SCNC: 6 MMOL/L — SIGNIFICANT CHANGE UP (ref 5–17)
APPEARANCE UR: ABNORMAL
APTT BLD: 32.4 SEC — SIGNIFICANT CHANGE UP (ref 27.5–36.3)
AST SERPL-CCNC: 32 U/L — SIGNIFICANT CHANGE UP (ref 15–37)
BACTERIA # UR AUTO: ABNORMAL
BASOPHILS # BLD AUTO: 0.05 K/UL — SIGNIFICANT CHANGE UP (ref 0–0.2)
BASOPHILS NFR BLD AUTO: 0.9 % — SIGNIFICANT CHANGE UP (ref 0–2)
BILIRUB SERPL-MCNC: 0.3 MG/DL — SIGNIFICANT CHANGE UP (ref 0.2–1.2)
BILIRUB UR-MCNC: NEGATIVE — SIGNIFICANT CHANGE UP
BUN SERPL-MCNC: 26 MG/DL — HIGH (ref 7–23)
CALCIUM SERPL-MCNC: 9.2 MG/DL — SIGNIFICANT CHANGE UP (ref 8.5–10.1)
CHLORIDE SERPL-SCNC: 107 MMOL/L — SIGNIFICANT CHANGE UP (ref 96–108)
CO2 SERPL-SCNC: 29 MMOL/L — SIGNIFICANT CHANGE UP (ref 22–31)
COLOR SPEC: YELLOW — SIGNIFICANT CHANGE UP
CREAT SERPL-MCNC: 1.27 MG/DL — SIGNIFICANT CHANGE UP (ref 0.5–1.3)
DIFF PNL FLD: ABNORMAL
EOSINOPHIL # BLD AUTO: 0.14 K/UL — SIGNIFICANT CHANGE UP (ref 0–0.5)
EOSINOPHIL NFR BLD AUTO: 2.5 % — SIGNIFICANT CHANGE UP (ref 0–6)
EPI CELLS # UR: NEGATIVE — SIGNIFICANT CHANGE UP
GLUCOSE SERPL-MCNC: 109 MG/DL — HIGH (ref 70–99)
GLUCOSE UR QL: NEGATIVE MG/DL — SIGNIFICANT CHANGE UP
HCT VFR BLD CALC: 41.8 % — SIGNIFICANT CHANGE UP (ref 39–50)
HGB BLD-MCNC: 13.9 G/DL — SIGNIFICANT CHANGE UP (ref 13–17)
IMM GRANULOCYTES NFR BLD AUTO: 0.4 % — SIGNIFICANT CHANGE UP (ref 0–1.5)
INR BLD: 1 RATIO — SIGNIFICANT CHANGE UP (ref 0.88–1.16)
KETONES UR-MCNC: NEGATIVE — SIGNIFICANT CHANGE UP
LEUKOCYTE ESTERASE UR-ACNC: ABNORMAL
LYMPHOCYTES # BLD AUTO: 1.27 K/UL — SIGNIFICANT CHANGE UP (ref 1–3.3)
LYMPHOCYTES # BLD AUTO: 22.8 % — SIGNIFICANT CHANGE UP (ref 13–44)
MCHC RBC-ENTMCNC: 30.2 PG — SIGNIFICANT CHANGE UP (ref 27–34)
MCHC RBC-ENTMCNC: 33.3 GM/DL — SIGNIFICANT CHANGE UP (ref 32–36)
MCV RBC AUTO: 90.9 FL — SIGNIFICANT CHANGE UP (ref 80–100)
MONOCYTES # BLD AUTO: 0.64 K/UL — SIGNIFICANT CHANGE UP (ref 0–0.9)
MONOCYTES NFR BLD AUTO: 11.5 % — SIGNIFICANT CHANGE UP (ref 2–14)
NEUTROPHILS # BLD AUTO: 3.44 K/UL — SIGNIFICANT CHANGE UP (ref 1.8–7.4)
NEUTROPHILS NFR BLD AUTO: 61.9 % — SIGNIFICANT CHANGE UP (ref 43–77)
NITRITE UR-MCNC: POSITIVE
NRBC # BLD: 0 /100 WBCS — SIGNIFICANT CHANGE UP (ref 0–0)
PH UR: 6 — SIGNIFICANT CHANGE UP (ref 5–8)
PLATELET # BLD AUTO: 167 K/UL — SIGNIFICANT CHANGE UP (ref 150–400)
POTASSIUM SERPL-MCNC: 4.5 MMOL/L — SIGNIFICANT CHANGE UP (ref 3.5–5.3)
POTASSIUM SERPL-SCNC: 4.5 MMOL/L — SIGNIFICANT CHANGE UP (ref 3.5–5.3)
PROT SERPL-MCNC: 7.8 GM/DL — SIGNIFICANT CHANGE UP (ref 6–8.3)
PROT UR-MCNC: 100 MG/DL
PROTHROM AB SERPL-ACNC: 11.1 SEC — SIGNIFICANT CHANGE UP (ref 10–12.9)
RBC # BLD: 4.6 M/UL — SIGNIFICANT CHANGE UP (ref 4.2–5.8)
RBC # FLD: 12.1 % — SIGNIFICANT CHANGE UP (ref 10.3–14.5)
RBC CASTS # UR COMP ASSIST: >50 /HPF (ref 0–4)
SODIUM SERPL-SCNC: 142 MMOL/L — SIGNIFICANT CHANGE UP (ref 135–145)
SP GR SPEC: 1.01 — SIGNIFICANT CHANGE UP (ref 1.01–1.02)
UROBILINOGEN FLD QL: 1 MG/DL
WBC # BLD: 5.56 K/UL — SIGNIFICANT CHANGE UP (ref 3.8–10.5)
WBC # FLD AUTO: 5.56 K/UL — SIGNIFICANT CHANGE UP (ref 3.8–10.5)
WBC UR QL: SIGNIFICANT CHANGE UP

## 2019-02-28 PROCEDURE — 99285 EMERGENCY DEPT VISIT HI MDM: CPT | Mod: 25

## 2019-02-28 RX ORDER — MORPHINE SULFATE 50 MG/1
4 CAPSULE, EXTENDED RELEASE ORAL ONCE
Qty: 0 | Refills: 0 | Status: DISCONTINUED | OUTPATIENT
Start: 2019-02-28 | End: 2019-02-28

## 2019-02-28 RX ORDER — SODIUM CHLORIDE 9 MG/ML
1000 INJECTION INTRAMUSCULAR; INTRAVENOUS; SUBCUTANEOUS ONCE
Qty: 0 | Refills: 0 | Status: COMPLETED | OUTPATIENT
Start: 2019-02-28 | End: 2019-02-28

## 2019-02-28 RX ORDER — CEFPODOXIME PROXETIL 100 MG
1 TABLET ORAL
Qty: 20 | Refills: 0 | OUTPATIENT
Start: 2019-02-28 | End: 2019-03-09

## 2019-02-28 RX ORDER — CEFTRIAXONE 500 MG/1
1000 INJECTION, POWDER, FOR SOLUTION INTRAMUSCULAR; INTRAVENOUS ONCE
Qty: 0 | Refills: 0 | Status: COMPLETED | OUTPATIENT
Start: 2019-02-28 | End: 2019-02-28

## 2019-02-28 RX ADMIN — SODIUM CHLORIDE 1000 MILLILITER(S): 9 INJECTION INTRAMUSCULAR; INTRAVENOUS; SUBCUTANEOUS at 11:15

## 2019-02-28 RX ADMIN — MORPHINE SULFATE 4 MILLIGRAM(S): 50 CAPSULE, EXTENDED RELEASE ORAL at 14:00

## 2019-02-28 RX ADMIN — SODIUM CHLORIDE 1000 MILLILITER(S): 9 INJECTION INTRAMUSCULAR; INTRAVENOUS; SUBCUTANEOUS at 10:15

## 2019-02-28 RX ADMIN — MORPHINE SULFATE 4 MILLIGRAM(S): 50 CAPSULE, EXTENDED RELEASE ORAL at 10:40

## 2019-02-28 RX ADMIN — MORPHINE SULFATE 4 MILLIGRAM(S): 50 CAPSULE, EXTENDED RELEASE ORAL at 10:15

## 2019-02-28 RX ADMIN — CEFTRIAXONE 1000 MILLIGRAM(S): 500 INJECTION, POWDER, FOR SOLUTION INTRAMUSCULAR; INTRAVENOUS at 16:11

## 2019-02-28 RX ADMIN — MORPHINE SULFATE 4 MILLIGRAM(S): 50 CAPSULE, EXTENDED RELEASE ORAL at 13:43

## 2019-02-28 NOTE — ED ADULT NURSE NOTE - NSIMPLEMENTINTERV_GEN_ALL_ED
Implemented All Universal Safety Interventions:  [place] to call system. Call bell, personal items and telephone within reach. Instruct patient to call for assistance. Room bathroom lighting operational. Non-slip footwear when patient is off stretcher. Physically safe environment: no spills, clutter or unnecessary equipment. Stretcher in lowest position, wheels locked, appropriate side rails in place.

## 2019-02-28 NOTE — ED ADULT NURSE REASSESSMENT NOTE - NS ED NURSE REASSESS COMMENT FT1
Pt rounded on at least hourly and given food. Pt awaiting urologist. Pt states pain controlled to acceptable level.

## 2019-02-28 NOTE — ED PROVIDER NOTE - CARE PROVIDERS DIRECT ADDRESSES
[email or web address]

## 2019-02-28 NOTE — ED PROVIDER NOTE - PROGRESS NOTE DETAILS
Labs w/hematuria o/w unremarkable.  D/W Bodi -- will have patient seen by Yassine. D/W Yassine -- will d/c on PO Abx, NSAIDs, already has oxybutynin at home.  Patient agreeable

## 2019-02-28 NOTE — ED ADULT TRIAGE NOTE - CHIEF COMPLAINT QUOTE
Patient presents with flank pain and nausea, patient reports he was here last week and was diagnosed with kidney stone

## 2019-02-28 NOTE — ED PROVIDER NOTE - CARE PROVIDER_API CALL
Amilcar Noel)  Urology  [address] [facility], [facility] [place] [address]  Phone: [phone]  Fax: [phone]  Follow Up Time:     Chandler Metzger)  Urology  [address] [facility], [facility] [address]  Phone: [phone]  Fax: [phone]  Follow Up Time:

## 2019-02-28 NOTE — ED PROVIDER NOTE - CONSTITUTIONAL, MLM
normal... Well appearing, well nourished, awake, alert, oriented to person, place, time/situation and +mildly uncomfortable appearing.

## 2019-02-28 NOTE — ED PROVIDER NOTE - OBJECTIVE STATEMENT
49 y/o male with PMHx of HTN, kidney stones, diverticulitis presents to the ED s/p right urethral stent 5 days ago now c/o pressure, dysuria and pain radiating into right leg. Denies fever, NVD or any other sx. Urologist: Dr. oNel - Scheduled for lithotripsy on 03/12. No pain medication today.

## 2019-02-28 NOTE — ED ADULT NURSE NOTE - OBJECTIVE STATEMENT
Pt with recent right side ureter stent placement c/o continued right side pain, pain upon urination and radiating to right upper leg

## 2019-02-28 NOTE — ED PROVIDER NOTE - NSFOLLOWUPINSTRUCTIONS_ED_ALL_ED_FT
Antibiotics as prescribed.  Oxybutynin as before. Continue Flomax  Naproxen for pain.   Follow-up with urology  Return with any fevers/worsening symptoms

## 2019-03-01 LAB
CULTURE RESULTS: NO GROWTH — SIGNIFICANT CHANGE UP
SPECIMEN SOURCE: SIGNIFICANT CHANGE UP

## 2019-03-03 RX ORDER — FAMOTIDINE 10 MG/ML
20 INJECTION INTRAVENOUS ONCE
Qty: 0 | Refills: 0 | Status: COMPLETED | OUTPATIENT
Start: 2019-03-04 | End: 2019-03-04

## 2019-03-03 RX ORDER — ACETAMINOPHEN 500 MG
975 TABLET ORAL ONCE
Qty: 0 | Refills: 0 | Status: COMPLETED | OUTPATIENT
Start: 2019-03-04 | End: 2019-03-04

## 2019-03-03 RX ORDER — SODIUM CHLORIDE 9 MG/ML
3 INJECTION INTRAMUSCULAR; INTRAVENOUS; SUBCUTANEOUS EVERY 8 HOURS
Qty: 0 | Refills: 0 | Status: DISCONTINUED | OUTPATIENT
Start: 2019-03-04 | End: 2019-03-04

## 2019-03-04 ENCOUNTER — APPOINTMENT (OUTPATIENT)
Dept: UROLOGY | Facility: HOSPITAL | Age: 49
End: 2019-03-04

## 2019-03-04 ENCOUNTER — OUTPATIENT (OUTPATIENT)
Dept: OUTPATIENT SERVICES | Facility: HOSPITAL | Age: 49
LOS: 1 days | Discharge: HOME | End: 2019-03-04
Payer: COMMERCIAL

## 2019-03-04 ENCOUNTER — RESULT REVIEW (OUTPATIENT)
Age: 49
End: 2019-03-04

## 2019-03-04 ENCOUNTER — OTHER (OUTPATIENT)
Age: 49
End: 2019-03-04

## 2019-03-04 VITALS
HEIGHT: 71 IN | HEART RATE: 59 BPM | RESPIRATION RATE: 15 BRPM | WEIGHT: 244.93 LBS | OXYGEN SATURATION: 96 % | TEMPERATURE: 98 F | SYSTOLIC BLOOD PRESSURE: 108 MMHG | DIASTOLIC BLOOD PRESSURE: 68 MMHG

## 2019-03-04 VITALS
SYSTOLIC BLOOD PRESSURE: 137 MMHG | OXYGEN SATURATION: 100 % | RESPIRATION RATE: 16 BRPM | DIASTOLIC BLOOD PRESSURE: 82 MMHG | TEMPERATURE: 98 F | HEART RATE: 44 BPM

## 2019-03-04 DIAGNOSIS — Z98.890 OTHER SPECIFIED POSTPROCEDURAL STATES: Chronic | ICD-10-CM

## 2019-03-04 LAB
BLD GP AB SCN SERPL QL: SIGNIFICANT CHANGE UP
TYPE + AB SCN PNL BLD: SIGNIFICANT CHANGE UP

## 2019-03-04 PROCEDURE — 88300 SURGICAL PATH GROSS: CPT | Mod: 26

## 2019-03-04 PROCEDURE — 52356 CYSTO/URETERO W/LITHOTRIPSY: CPT | Mod: RT

## 2019-03-04 RX ORDER — PHENAZOPYRIDINE HCL 100 MG
1 TABLET ORAL
Qty: 9 | Refills: 0 | OUTPATIENT
Start: 2019-03-04 | End: 2019-03-06

## 2019-03-04 RX ORDER — OXYCODONE HYDROCHLORIDE 5 MG/1
5 TABLET ORAL ONCE
Qty: 0 | Refills: 0 | Status: DISCONTINUED | OUTPATIENT
Start: 2019-03-04 | End: 2019-03-04

## 2019-03-04 RX ORDER — MULTIVIT-MIN/FERROUS GLUCONATE 9 MG/15 ML
1 LIQUID (ML) ORAL
Qty: 0 | Refills: 0 | COMMUNITY

## 2019-03-04 RX ORDER — HYDROCORTISONE 1 %
1 OINTMENT (GRAM) TOPICAL
Qty: 0 | Refills: 0 | COMMUNITY

## 2019-03-04 RX ORDER — NEBIVOLOL HYDROCHLORIDE 5 MG/1
1 TABLET ORAL
Qty: 0 | Refills: 0 | COMMUNITY

## 2019-03-04 RX ORDER — FEXOFENADINE HCL AND PSEUDOEPHEDRINE HCI 60; 120 MG/1; MG/1
0 TABLET, EXTENDED RELEASE ORAL
Qty: 0 | Refills: 0 | COMMUNITY

## 2019-03-04 RX ORDER — DUTASTERIDE AND TAMSULOSIN HYDROCHLORIDE CAPSULES .5; .4 MG/1; MG/1
1 CAPSULE ORAL
Qty: 0 | Refills: 0 | COMMUNITY

## 2019-03-04 RX ORDER — FENTANYL CITRATE 50 UG/ML
50 INJECTION INTRAVENOUS
Qty: 0 | Refills: 0 | Status: DISCONTINUED | OUTPATIENT
Start: 2019-03-04 | End: 2019-03-04

## 2019-03-04 RX ORDER — FENTANYL CITRATE 50 UG/ML
25 INJECTION INTRAVENOUS
Qty: 0 | Refills: 0 | Status: DISCONTINUED | OUTPATIENT
Start: 2019-03-04 | End: 2019-03-04

## 2019-03-04 RX ORDER — ALPRAZOLAM 0.25 MG
1 TABLET ORAL
Qty: 0 | Refills: 0 | COMMUNITY

## 2019-03-04 RX ORDER — ONDANSETRON 8 MG/1
4 TABLET, FILM COATED ORAL ONCE
Qty: 0 | Refills: 0 | Status: DISCONTINUED | OUTPATIENT
Start: 2019-03-04 | End: 2019-03-19

## 2019-03-04 RX ORDER — PHENAZOPYRIDINE HCL 100 MG
200 TABLET ORAL ONCE
Qty: 0 | Refills: 0 | Status: COMPLETED | OUTPATIENT
Start: 2019-03-04 | End: 2019-03-04

## 2019-03-04 RX ORDER — PANTOPRAZOLE SODIUM 20 MG/1
1 TABLET, DELAYED RELEASE ORAL
Qty: 0 | Refills: 0 | COMMUNITY

## 2019-03-04 RX ADMIN — Medication 975 MILLIGRAM(S): at 08:04

## 2019-03-04 RX ADMIN — Medication 975 MILLIGRAM(S): at 08:03

## 2019-03-04 RX ADMIN — Medication 200 MILLIGRAM(S): at 09:59

## 2019-03-04 RX ADMIN — FAMOTIDINE 20 MILLIGRAM(S): 10 INJECTION INTRAVENOUS at 08:03

## 2019-03-04 NOTE — ASU DISCHARGE PLAN (ADULT/PEDIATRIC). - NOTIFY
Persistent Nausea and Vomiting/Fever greater than 101/Inability to Tolerate Liquids or Foods/Pain not relieved by Medications

## 2019-03-04 NOTE — ASU DISCHARGE PLAN (ADULT/PEDIATRIC). - MEDICATION SUMMARY - MEDICATIONS TO TAKE
I will START or STAY ON the medications listed below when I get home from the hospital:    naproxen 500 mg oral tablet  -- 1 tab(s) by mouth 2 times a day, As Needed - for moderate pain   -- Check with your doctor before becoming pregnant.  May cause drowsiness or dizziness.  Obtain medical advice before taking any non-prescription drugs as some may affect the action of this medication.  Take with food or milk.    -- Indication: For Home med    dutasteride-tamsulosin 0.5 mg-0.4 mg oral capsule  -- 1 cap(s) by mouth once a day  -- Indication: For Home med    PARoxetine 30 mg oral tablet  -- 1 tab(s) by mouth once a day  -- Indication: For Home med    ALPRAZolam 0.5 mg oral tablet  -- 1 tab(s) by mouth once a day, As Needed - for anxiety  -- Indication: For Home med    Bystolic 10 mg oral tablet  -- 1 tab(s) by mouth once a day  -- Indication: For Home med    Proctozone HC 2.5% rectal cream with applicator  -- 1 application rectally 2 times a day, As Needed - hemmroids  -- Indication: For Home med    Pyridium 100 mg oral tablet  -- 1 tab(s) by mouth 3 times a day (after meals)   -- May discolor urine or feces.  Medication should be taken with plenty of water.  Take with food or milk.    -- Indication: For s/p Ureteroscopy    pantoprazole 40 mg oral delayed release tablet  -- 1 tab(s) by mouth once a day  -- Indication: For Home med    Allegra-D 12 Hour  -- Indication: For Home med    Centrum oral tablet  -- 1 tab(s) by mouth once a day  -- Indication: For Home med

## 2019-03-04 NOTE — BRIEF OPERATIVE NOTE - PROCEDURE
<<-----Click on this checkbox to enter Procedure Ureteroscopy with laser lithotripsy and stent placement  03/04/2019  Right  Active  [email or web address]

## 2019-03-05 LAB — SURGICAL PATHOLOGY FINAL REPORT - CH: SIGNIFICANT CHANGE UP

## 2019-03-06 LAB
COMPN STONE: SIGNIFICANT CHANGE UP
CULTURE RESULTS: SIGNIFICANT CHANGE UP
SPECIMEN SOURCE: SIGNIFICANT CHANGE UP

## 2019-03-08 DIAGNOSIS — N40.0 BENIGN PROSTATIC HYPERPLASIA WITHOUT LOWER URINARY TRACT SYMPTOMS: ICD-10-CM

## 2019-03-08 DIAGNOSIS — N20.1 CALCULUS OF URETER: ICD-10-CM

## 2019-03-08 DIAGNOSIS — I10 ESSENTIAL (PRIMARY) HYPERTENSION: ICD-10-CM

## 2019-03-08 DIAGNOSIS — F41.9 ANXIETY DISORDER, UNSPECIFIED: ICD-10-CM

## 2019-03-08 DIAGNOSIS — K21.9 GASTRO-ESOPHAGEAL REFLUX DISEASE WITHOUT ESOPHAGITIS: ICD-10-CM

## 2019-03-12 ENCOUNTER — APPOINTMENT (OUTPATIENT)
Dept: UROLOGY | Facility: HOSPITAL | Age: 49
End: 2019-03-12

## 2019-03-13 ENCOUNTER — APPOINTMENT (OUTPATIENT)
Age: 49
End: 2019-03-13
Payer: COMMERCIAL

## 2019-03-13 VITALS
HEART RATE: 68 BPM | RESPIRATION RATE: 16 BRPM | WEIGHT: 245 LBS | SYSTOLIC BLOOD PRESSURE: 134 MMHG | HEIGHT: 71 IN | BODY MASS INDEX: 34.3 KG/M2 | DIASTOLIC BLOOD PRESSURE: 80 MMHG | TEMPERATURE: 98.4 F | OXYGEN SATURATION: 95 %

## 2019-03-13 DIAGNOSIS — N20.0 CALCULUS OF KIDNEY: ICD-10-CM

## 2019-03-13 DIAGNOSIS — Z46.6 ENCOUNTER FOR FITTING AND ADJUSTMENT OF URINARY DEVICE: ICD-10-CM

## 2019-03-13 PROCEDURE — 52310 CYSTOSCOPY AND TREATMENT: CPT

## 2019-03-13 RX ORDER — TAMSULOSIN HYDROCHLORIDE 0.4 MG/1
0.4 CAPSULE ORAL
Qty: 30 | Refills: 5 | Status: ACTIVE | COMMUNITY
Start: 2017-03-27

## 2019-04-24 ENCOUNTER — APPOINTMENT (OUTPATIENT)
Dept: ULTRASOUND IMAGING | Facility: CLINIC | Age: 49
End: 2019-04-24
Payer: COMMERCIAL

## 2019-04-24 ENCOUNTER — LABORATORY RESULT (OUTPATIENT)
Age: 49
End: 2019-04-24

## 2019-04-24 ENCOUNTER — OUTPATIENT (OUTPATIENT)
Dept: OUTPATIENT SERVICES | Facility: HOSPITAL | Age: 49
LOS: 1 days | End: 2019-04-24
Payer: COMMERCIAL

## 2019-04-24 DIAGNOSIS — Z00.8 ENCOUNTER FOR OTHER GENERAL EXAMINATION: ICD-10-CM

## 2019-04-24 DIAGNOSIS — Z98.890 OTHER SPECIFIED POSTPROCEDURAL STATES: Chronic | ICD-10-CM

## 2019-04-24 PROCEDURE — 76775 US EXAM ABDO BACK WALL LIM: CPT

## 2019-04-24 PROCEDURE — 76775 US EXAM ABDO BACK WALL LIM: CPT | Mod: 26

## 2019-05-08 ENCOUNTER — APPOINTMENT (OUTPATIENT)
Dept: UROLOGY | Facility: CLINIC | Age: 49
End: 2019-05-08

## 2019-06-13 NOTE — ED PROVIDER NOTE - CROS ED RESP ALL NEG
Pharmacy is  requesting medication refill.  Please approve or deny this request.    Rx requested:  Requested Prescriptions     Pending Prescriptions Disp Refills    epinastine (ELESTAT) 0.05 % SOLN [Pharmacy Med Name: EPINASTINE HCL 0.05% EYE DROPS] 5 mL 1     Sig: PLACE 1 DROP IN BOTH EYES TWICE DAILY AS NEEDED         Last Office Visit:   6/4/2019      Next Visit Date:  Future Appointments   Date Time Provider Michelle Urban   9/20/2019  7:30 AM [facility]  - - -

## 2019-09-18 ENCOUNTER — APPOINTMENT (OUTPATIENT)
Dept: DERMATOLOGY | Facility: CLINIC | Age: 49
End: 2019-09-18
Payer: COMMERCIAL

## 2019-09-18 PROCEDURE — 99213 OFFICE O/P EST LOW 20 MIN: CPT

## 2019-09-18 RX ORDER — POTASSIUM CITRATE 10 MEQ/1
10 MEQ TABLET, EXTENDED RELEASE ORAL TWICE DAILY
Qty: 180 | Refills: 3 | Status: DISCONTINUED | COMMUNITY
Start: 2019-02-25 | End: 2019-09-18

## 2019-09-18 RX ORDER — HYDROCHLOROTHIAZIDE 25 MG/1
25 TABLET ORAL
Qty: 90 | Refills: 3 | Status: DISCONTINUED | COMMUNITY
Start: 2019-02-25 | End: 2019-09-18

## 2019-09-18 RX ORDER — ALLOPURINOL 300 MG/1
300 TABLET ORAL DAILY
Qty: 90 | Refills: 3 | Status: DISCONTINUED | COMMUNITY
Start: 2019-02-25 | End: 2019-09-18

## 2019-09-18 RX ORDER — OXYBUTYNIN CHLORIDE 10 MG/1
10 TABLET, EXTENDED RELEASE ORAL
Qty: 90 | Refills: 2 | Status: DISCONTINUED | COMMUNITY
Start: 2019-02-26 | End: 2019-09-18

## 2019-09-18 NOTE — ASSESSMENT
[FreeTextEntry1] : Benign nevi or fibromas;  L chest, L upper back\par Also:  mild seb derm of scalp;  discussed OTCs; \par Complete skin examination is negative for malignancy;\par The patient has a history of significant sun exposure.  Continue annual exams.

## 2019-09-18 NOTE — PHYSICAL EXAM
[Full Body Skin Exam Performed] : performed [FreeTextEntry3] : Skin examination performed of the face, neck, trunk, arms, legs; \par The patient is well, alert and oriented, pleasant and cooperative.\par Eyelids, conjunctivae, oral mucosa, digits and nails all normal.  \par No cervical adenopathy.\par \par Normal findings include:\par \par Seborrheic keratoses\par Angiomas\par + lentigines and solar damage are present in sun exposed areas; back, shoulders;\par fleshy, firm pink small papules;  L chest, L upper back\par \par No lesions were suspicious for malignancy. \par \par

## 2019-09-18 NOTE — HISTORY OF PRESENT ILLNESS
[de-identified] : Pt. presents for skin check;\par No itching, bleeding, growing, changing lesions noted;\par Severity:  mild  \par Modifying factors:  none\par Associated symptoms:  none\par Context:  no association with activity

## 2020-02-18 ENCOUNTER — APPOINTMENT (OUTPATIENT)
Dept: COLORECTAL SURGERY | Facility: CLINIC | Age: 50
End: 2020-02-18
Payer: COMMERCIAL

## 2020-02-18 DIAGNOSIS — K64.8 OTHER HEMORRHOIDS: ICD-10-CM

## 2020-02-18 DIAGNOSIS — K60.3 ANAL FISTULA: ICD-10-CM

## 2020-02-18 DIAGNOSIS — Z12.11 ENCOUNTER FOR SCREENING FOR MALIGNANT NEOPLASM OF COLON: ICD-10-CM

## 2020-02-18 PROCEDURE — 99215 OFFICE O/P EST HI 40 MIN: CPT | Mod: 25

## 2020-02-18 PROCEDURE — 46600 DIAGNOSTIC ANOSCOPY SPX: CPT

## 2020-02-18 NOTE — PHYSICAL EXAM
[Normal] : was normal [None] : there was no rectal abscess [Respiratory Effort] : normal respiratory effort [Calm] : calm [de-identified] : soft, nontender, nondistended.  Small reducible umbilical hernia [de-identified] : well appearing, in no distress [de-identified] : two suspicious external fistulous openings in the left perianal region (8' clock and 11 o' clock) with no active drainage of inflammation. No clear internal opening noted. Prolapsing internal hemorrhoids.  [de-identified] : moves all extremities without difficulty [de-identified] : normocephalic, atraumatic [de-identified] : warm and dry [de-identified] : alert and oriented x3

## 2020-02-18 NOTE — HISTORY OF PRESENT ILLNESS
[FreeTextEntry1] : 49-year-old male who has a history of diverticulitis s/p Nathalia procedure and subsequent reversal in 2017 who presents for follow up. He has anal fistulas which he has elected to not have surgery on. he reports intermittent drainage from the area, about every 3 months especially when he has loose stools. He also has hemorrhoids which he feels flares up from time to time. He is here to discuss repeat colonoscopy, last scope was before surgery and 3 year follow up recommended. \par \par He has an umbilical hernia but has no pain from it. Has gained some weight recently.

## 2020-02-20 ENCOUNTER — TRANSCRIPTION ENCOUNTER (OUTPATIENT)
Age: 50
End: 2020-02-20

## 2020-03-10 ENCOUNTER — TRANSCRIPTION ENCOUNTER (OUTPATIENT)
Age: 50
End: 2020-03-10

## 2020-03-11 ENCOUNTER — OUTPATIENT (OUTPATIENT)
Dept: OUTPATIENT SERVICES | Facility: HOSPITAL | Age: 50
LOS: 1 days | End: 2020-03-11
Payer: COMMERCIAL

## 2020-03-11 ENCOUNTER — RESULT REVIEW (OUTPATIENT)
Age: 50
End: 2020-03-11

## 2020-03-11 ENCOUNTER — NON-APPOINTMENT (OUTPATIENT)
Age: 50
End: 2020-03-11

## 2020-03-11 ENCOUNTER — APPOINTMENT (OUTPATIENT)
Dept: COLORECTAL SURGERY | Facility: HOSPITAL | Age: 50
End: 2020-03-11
Payer: COMMERCIAL

## 2020-03-11 DIAGNOSIS — Z12.11 ENCOUNTER FOR SCREENING FOR MALIGNANT NEOPLASM OF COLON: ICD-10-CM

## 2020-03-11 DIAGNOSIS — Z98.890 OTHER SPECIFIED POSTPROCEDURAL STATES: Chronic | ICD-10-CM

## 2020-03-11 PROCEDURE — 88305 TISSUE EXAM BY PATHOLOGIST: CPT

## 2020-03-11 PROCEDURE — 45380 COLONOSCOPY AND BIOPSY: CPT

## 2020-03-11 PROCEDURE — 88305 TISSUE EXAM BY PATHOLOGIST: CPT | Mod: 26

## 2020-03-11 PROCEDURE — 45380 COLONOSCOPY AND BIOPSY: CPT | Mod: PT

## 2020-07-20 ENCOUNTER — TRANSCRIPTION ENCOUNTER (OUTPATIENT)
Age: 50
End: 2020-07-20

## 2020-09-16 ENCOUNTER — APPOINTMENT (OUTPATIENT)
Dept: DERMATOLOGY | Facility: CLINIC | Age: 50
End: 2020-09-16
Payer: COMMERCIAL

## 2020-09-16 VITALS — BODY MASS INDEX: 34.87 KG/M2 | WEIGHT: 250 LBS

## 2020-09-16 DIAGNOSIS — D48.5 NEOPLASM OF UNCERTAIN BEHAVIOR OF SKIN: ICD-10-CM

## 2020-09-16 DIAGNOSIS — D23.9 OTHER BENIGN NEOPLASM OF SKIN, UNSPECIFIED: ICD-10-CM

## 2020-09-16 PROCEDURE — 99213 OFFICE O/P EST LOW 20 MIN: CPT | Mod: 25

## 2020-09-16 PROCEDURE — 11102 TANGNTL BX SKIN SINGLE LES: CPT

## 2020-09-16 NOTE — ASSESSMENT
[FreeTextEntry1] : The patient was instructed to check portal and/or call the office in one week for biopsy results.\par r/o BCC\par Plan to treat by D&C if the biopsy is positive. \par \par The patient has a history of significant sun exposure.  Continue annual exams.

## 2020-09-16 NOTE — HISTORY OF PRESENT ILLNESS
[de-identified] : Pt. presents for skin check;\par No itching, bleeding, growing, changing lesions noted;\par Severity:  mild  \par Modifying factors:  none\par Associated symptoms:  none\par Context:  no association with activity

## 2020-09-16 NOTE — PHYSICAL EXAM
[Full Body Skin Exam Performed] : performed [FreeTextEntry3] : Skin examination performed of the face, neck, trunk, arms, legs; \par The patient is well, alert and oriented, pleasant and cooperative.\par Eyelids, conjunctivae, oral mucosa, digits and nails all normal.  \par No cervical adenopathy.\par \par Normal findings include:\par \par Seborrheic keratoses\par Angiomas\par + lentigines and solar damage are present in sun exposed areas; back, shoulders;\par scaling pearly erythematous papule L shoulder; \par follicular papules;  buttocks/hips\par \par No lesions were suspicious for malignancy. \par \par

## 2020-09-25 ENCOUNTER — TRANSCRIPTION ENCOUNTER (OUTPATIENT)
Age: 50
End: 2020-09-25

## 2020-09-25 LAB — CORE LAB BIOPSY: NORMAL

## 2020-10-19 ENCOUNTER — APPOINTMENT (OUTPATIENT)
Dept: CARDIOLOGY | Facility: CLINIC | Age: 50
End: 2020-10-19
Payer: COMMERCIAL

## 2020-10-19 ENCOUNTER — NON-APPOINTMENT (OUTPATIENT)
Age: 50
End: 2020-10-19

## 2020-10-19 VITALS
BODY MASS INDEX: 36.96 KG/M2 | SYSTOLIC BLOOD PRESSURE: 124 MMHG | DIASTOLIC BLOOD PRESSURE: 78 MMHG | HEART RATE: 55 BPM | OXYGEN SATURATION: 96 % | HEIGHT: 71 IN | WEIGHT: 264 LBS

## 2020-10-19 DIAGNOSIS — I10 ESSENTIAL (PRIMARY) HYPERTENSION: ICD-10-CM

## 2020-10-19 PROCEDURE — 99072 ADDL SUPL MATRL&STAF TM PHE: CPT

## 2020-10-19 PROCEDURE — 93000 ELECTROCARDIOGRAM COMPLETE: CPT

## 2020-10-19 PROCEDURE — 99214 OFFICE O/P EST MOD 30 MIN: CPT

## 2020-10-19 RX ORDER — NEBIVOLOL HYDROCHLORIDE 5 MG/1
5 TABLET ORAL DAILY
Qty: 90 | Refills: 3 | Status: ACTIVE | COMMUNITY
Start: 2018-01-02

## 2021-04-19 ENCOUNTER — TRANSCRIPTION ENCOUNTER (OUTPATIENT)
Age: 51
End: 2021-04-19

## 2021-06-24 NOTE — PATIENT PROFILE ADULT - NSPROALCOHOLUSE2_GEN_A_NUR
Implemented All Universal Safety Interventions:  [place] to call system. Call bell, personal items and telephone within reach. Instruct patient to call for assistance. Room bathroom lighting operational. Non-slip footwear when patient is off stretcher. Physically safe environment: no spills, clutter or unnecessary equipment. Stretcher in lowest position, wheels locked, appropriate side rails in place.
never

## 2021-09-15 ENCOUNTER — APPOINTMENT (OUTPATIENT)
Dept: DERMATOLOGY | Facility: CLINIC | Age: 51
End: 2021-09-15
Payer: COMMERCIAL

## 2021-09-15 PROCEDURE — 99213 OFFICE O/P EST LOW 20 MIN: CPT

## 2021-09-15 RX ORDER — PANTOPRAZOLE SODIUM 40 MG/1
40 TABLET, DELAYED RELEASE ORAL DAILY
Refills: 0 | Status: DISCONTINUED | COMMUNITY
End: 2021-09-15

## 2021-09-15 NOTE — ASSESSMENT
[FreeTextEntry1] : Complete skin examination is negative for malignancy; Multiple new concerns were addressed and discussed.\par Therapeutic options and their risks and benefits; along with multiple diagnostic possibilities were discussed at length;\par risks and benefits of skin biopsy and/or other further study were discussed;\par \par The patient has a history of significant sun exposure.  Continue annual exams.

## 2021-09-15 NOTE — PHYSICAL EXAM
[Full Body Skin Exam Performed] : performed [FreeTextEntry3] : Skin examination performed of the face, neck, trunk, arms, legs; \par The patient is well, alert and oriented, pleasant and cooperative.\par Eyelids, conjunctivae, oral mucosa, digits and nails all normal.  \par No cervical adenopathy.\par \par Normal findings include:\par \par Seborrheic keratoses\par Angiomas\par + lentigines and solar damage are present in sun exposed areas; back, shoulders;\par follicular papules;  buttocks/hips, some on posterior scalp\par \par No lesions were suspicious for malignancy. \par \par

## 2021-09-15 NOTE — HISTORY OF PRESENT ILLNESS
[de-identified] : Pt. presents for skin check;\par c/o few spots of concern;  \par Severity:  mild  \par Modifying factors:  none\par Associated symptoms:  none\par Context:  no association with activity\par

## 2021-10-25 ENCOUNTER — EMERGENCY (EMERGENCY)
Facility: HOSPITAL | Age: 51
LOS: 1 days | End: 2021-10-25
Admitting: EMERGENCY MEDICINE
Payer: COMMERCIAL

## 2021-10-25 DIAGNOSIS — Z98.890 OTHER SPECIFIED POSTPROCEDURAL STATES: Chronic | ICD-10-CM

## 2021-10-25 PROCEDURE — 70450 CT HEAD/BRAIN W/O DYE: CPT | Mod: 26

## 2021-10-25 PROCEDURE — 72125 CT NECK SPINE W/O DYE: CPT | Mod: 26

## 2021-10-25 PROCEDURE — 99285 EMERGENCY DEPT VISIT HI MDM: CPT

## 2021-11-19 ENCOUNTER — APPOINTMENT (OUTPATIENT)
Dept: UROLOGY | Facility: CLINIC | Age: 51
End: 2021-11-19
Payer: COMMERCIAL

## 2021-11-19 VITALS — SYSTOLIC BLOOD PRESSURE: 131 MMHG | DIASTOLIC BLOOD PRESSURE: 85 MMHG | HEART RATE: 61 BPM | OXYGEN SATURATION: 100 %

## 2021-11-19 PROCEDURE — 99214 OFFICE O/P EST MOD 30 MIN: CPT

## 2021-11-22 ENCOUNTER — APPOINTMENT (OUTPATIENT)
Dept: ULTRASOUND IMAGING | Facility: CLINIC | Age: 51
End: 2021-11-22
Payer: COMMERCIAL

## 2021-11-22 ENCOUNTER — OUTPATIENT (OUTPATIENT)
Dept: OUTPATIENT SERVICES | Facility: HOSPITAL | Age: 51
LOS: 1 days | End: 2021-11-22
Payer: COMMERCIAL

## 2021-11-22 DIAGNOSIS — Z87.442 PERSONAL HISTORY OF URINARY CALCULI: ICD-10-CM

## 2021-11-22 DIAGNOSIS — Z98.890 OTHER SPECIFIED POSTPROCEDURAL STATES: Chronic | ICD-10-CM

## 2021-11-22 PROCEDURE — 76770 US EXAM ABDO BACK WALL COMP: CPT | Mod: 26

## 2021-11-22 PROCEDURE — 76770 US EXAM ABDO BACK WALL COMP: CPT
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
## 2021-11-28 NOTE — ASSESSMENT
[FreeTextEntry1] : Benign Prostatic Hyperplasia:\par Continue Flomax. \par \par Renal cyst:\par Discussed that Renal cysts are a common finding on routine radiological studies. Autopsy studies in patients over the age of 50 reveal greater than a 50% chance of having at least one simple renal cyst.\par And that renal cysts may be classified as simple or complex depending how they look on imaging. Discussed complexity of renal cysts and its implications as regards malignancy. \par  \par History of kidney stone:\par Reiterated kidney stone prevention diet.  \par \par Will get Renal and Bladder Ultrasound.\par Will inform results.

## 2021-12-09 ENCOUNTER — TRANSCRIPTION ENCOUNTER (OUTPATIENT)
Age: 51
End: 2021-12-09

## 2021-12-10 ENCOUNTER — NON-APPOINTMENT (OUTPATIENT)
Age: 51
End: 2021-12-10

## 2022-01-26 NOTE — ASU PATIENT PROFILE, ADULT - NSALCOHOLUSECOMMENT_GEN_ALL_CORE_FT
[FreeTextEntry3] : Medical record entries made by the scribe today today, were at my direction and personally dictated to them by me, Dr. Rosalie Peña on Jan 25, 2022. I have reviewed the chart and agree that the record accurately reflects my personal performance of the history, physical exam, assessment, and plan.\par  Social

## 2022-05-21 ENCOUNTER — EMERGENCY (EMERGENCY)
Facility: HOSPITAL | Age: 52
LOS: 1 days | Discharge: HOME | End: 2022-05-21
Admitting: EMERGENCY MEDICINE
Payer: COMMERCIAL

## 2022-05-21 ENCOUNTER — TRANSCRIPTION ENCOUNTER (OUTPATIENT)
Age: 52
End: 2022-05-21

## 2022-05-21 DIAGNOSIS — Z98.890 OTHER SPECIFIED POSTPROCEDURAL STATES: Chronic | ICD-10-CM

## 2022-05-21 DIAGNOSIS — R06.00 DYSPNEA, UNSPECIFIED: ICD-10-CM

## 2022-05-21 DIAGNOSIS — U07.1 COVID-19: ICD-10-CM

## 2022-05-21 PROCEDURE — 99284 EMERGENCY DEPT VISIT MOD MDM: CPT

## 2022-05-24 ENCOUNTER — OUTPATIENT (OUTPATIENT)
Dept: OUTPATIENT SERVICES | Facility: HOSPITAL | Age: 52
LOS: 1 days | End: 2022-05-24

## 2022-05-24 ENCOUNTER — APPOINTMENT (OUTPATIENT)
Dept: DISASTER EMERGENCY | Facility: HOSPITAL | Age: 52
End: 2022-05-24

## 2022-05-24 VITALS
OXYGEN SATURATION: 96 % | HEIGHT: 70 IN | RESPIRATION RATE: 18 BRPM | WEIGHT: 250 LBS | TEMPERATURE: 98 F | SYSTOLIC BLOOD PRESSURE: 120 MMHG | DIASTOLIC BLOOD PRESSURE: 81 MMHG | HEART RATE: 61 BPM

## 2022-05-24 VITALS
DIASTOLIC BLOOD PRESSURE: 76 MMHG | SYSTOLIC BLOOD PRESSURE: 110 MMHG | TEMPERATURE: 98 F | HEART RATE: 60 BPM | OXYGEN SATURATION: 98 % | RESPIRATION RATE: 18 BRPM

## 2022-05-24 DIAGNOSIS — Z98.890 OTHER SPECIFIED POSTPROCEDURAL STATES: Chronic | ICD-10-CM

## 2022-05-24 DIAGNOSIS — U07.1 COVID-19: ICD-10-CM

## 2022-05-24 RX ORDER — BEBTELOVIMAB 87.5 MG/ML
175 INJECTION, SOLUTION INTRAVENOUS ONCE
Refills: 0 | Status: COMPLETED | OUTPATIENT
Start: 2022-05-24 | End: 2022-05-24

## 2022-05-24 RX ADMIN — BEBTELOVIMAB 175 MILLIGRAM(S): 87.5 INJECTION, SOLUTION INTRAVENOUS at 15:44

## 2022-06-08 ENCOUNTER — APPOINTMENT (OUTPATIENT)
Dept: UROLOGY | Facility: CLINIC | Age: 52
End: 2022-06-08

## 2022-06-27 ENCOUNTER — NON-APPOINTMENT (OUTPATIENT)
Age: 52
End: 2022-06-27

## 2022-08-11 ENCOUNTER — APPOINTMENT (OUTPATIENT)
Dept: CARDIOLOGY | Facility: CLINIC | Age: 52
End: 2022-08-11

## 2022-09-14 ENCOUNTER — APPOINTMENT (OUTPATIENT)
Dept: DERMATOLOGY | Facility: CLINIC | Age: 52
End: 2022-09-14
Payer: COMMERCIAL

## 2022-09-14 DIAGNOSIS — L98.8 OTHER SPECIFIED DISORDERS OF THE SKIN AND SUBCUTANEOUS TISSUE: ICD-10-CM

## 2022-09-14 PROCEDURE — 17000 DESTRUCT PREMALG LESION: CPT

## 2022-09-14 PROCEDURE — 99213 OFFICE O/P EST LOW 20 MIN: CPT | Mod: 25
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
## 2022-09-14 NOTE — ASSESSMENT
[FreeTextEntry1] : Complete skin examination is negative for malignancy; Multiple new concerns were addressed and discussed.\par Therapeutic options and their risks and benefits; along with multiple diagnostic possibilities were discussed at length;\par risks and benefits of skin biopsy and/or other further study were discussed;\par \par The patient has a history of significant sun exposure.  Continue annual exams. \par \par LN2 to AK R forearm

## 2022-09-14 NOTE — PHYSICAL EXAM
[Full Body Skin Exam Performed] : performed [FreeTextEntry3] : Skin examination performed of the face, neck, trunk, arms, legs; \par The patient is well, alert and oriented, pleasant and cooperative.\par Eyelids, conjunctivae, oral mucosa, digits and nails all normal.  \par No cervical adenopathy.\par \par Normal findings include:\par \par Seborrheic keratoses\par Angiomas\par + lentigines and solar damage are present in sun exposed areas; back, shoulders;\par scaling erythematous papule; Right forearm\par \par No lesions were suspicious for malignancy. \par \par
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
## 2022-09-28 ENCOUNTER — NON-APPOINTMENT (OUTPATIENT)
Age: 52
End: 2022-09-28

## 2022-10-07 ENCOUNTER — APPOINTMENT (OUTPATIENT)
Dept: RADIOLOGY | Facility: CLINIC | Age: 52
End: 2022-10-07
Payer: COMMERCIAL

## 2022-10-07 ENCOUNTER — OUTPATIENT (OUTPATIENT)
Dept: OUTPATIENT SERVICES | Facility: HOSPITAL | Age: 52
LOS: 1 days | End: 2022-10-07
Payer: COMMERCIAL

## 2022-10-07 ENCOUNTER — APPOINTMENT (OUTPATIENT)
Dept: ULTRASOUND IMAGING | Facility: CLINIC | Age: 52
End: 2022-10-07
Payer: COMMERCIAL

## 2022-10-07 DIAGNOSIS — Z00.8 ENCOUNTER FOR OTHER GENERAL EXAMINATION: ICD-10-CM

## 2022-10-07 DIAGNOSIS — Z87.442 PERSONAL HISTORY OF URINARY CALCULI: ICD-10-CM

## 2022-10-07 DIAGNOSIS — N40.1 BENIGN PROSTATIC HYPERPLASIA WITH LOWER URINARY TRACT SYMPTOMS: ICD-10-CM

## 2022-10-07 DIAGNOSIS — N28.1 CYST OF KIDNEY, ACQUIRED: ICD-10-CM

## 2022-10-07 DIAGNOSIS — Z98.890 OTHER SPECIFIED POSTPROCEDURAL STATES: Chronic | ICD-10-CM

## 2022-10-07 PROCEDURE — 76770 US EXAM ABDO BACK WALL COMP: CPT

## 2022-10-07 PROCEDURE — 76770 US EXAM ABDO BACK WALL COMP: CPT | Mod: 26

## 2022-10-07 PROCEDURE — 74018 RADEX ABDOMEN 1 VIEW: CPT | Mod: 26

## 2022-10-07 PROCEDURE — 74018 RADEX ABDOMEN 1 VIEW: CPT

## 2022-10-12 ENCOUNTER — APPOINTMENT (OUTPATIENT)
Dept: UROLOGY | Facility: CLINIC | Age: 52
End: 2022-10-12

## 2022-10-12 VITALS
HEIGHT: 71 IN | DIASTOLIC BLOOD PRESSURE: 84 MMHG | BODY MASS INDEX: 35.7 KG/M2 | HEART RATE: 61 BPM | SYSTOLIC BLOOD PRESSURE: 131 MMHG | OXYGEN SATURATION: 97 % | WEIGHT: 255 LBS

## 2022-10-12 PROCEDURE — 99214 OFFICE O/P EST MOD 30 MIN: CPT

## 2022-10-16 NOTE — PHYSICAL EXAM
[Urethral Meatus] : meatus normal [Penis Abnormality] : normal circumcised penis [Scrotum] : the scrotum was normal [Epididymis] : the epididymides were normal [Testes Tenderness] : no tenderness of the testes [Testes Mass (___cm)] : there were no testicular masses [Prostate Tenderness] : the prostate was not tender [No Prostate Nodules] : no prostate nodules [Prostate Size ___ (0-4)] : prostate size [unfilled] (scale: 0-4) [Normal Appearance] : normal appearance [General Appearance - In No Acute Distress] : no acute distress [Abdomen Soft] : soft [Abdomen Tenderness] : non-tender [Skin Color & Pigmentation] : normal skin color and pigmentation [] : no respiratory distress [Oriented To Time, Place, And Person] : oriented to person, place, and time [Normal Station and Gait] : the gait and station were normal for the patient's age [FreeTextEntry1] : normal peripheral circulation
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
## 2022-10-16 NOTE — ASSESSMENT
[FreeTextEntry1] : Reviewed records, discussed labs and imaging. \par Testosterone: 268(923/22). \par \par Hypogonadism:\par Discussed risks and benefits of Testosterone replacement therapy including effect on spermatogenesis. \par Wants to proceed with Testosterone replacement therapy: Testosterone pill. \par Will get Total and Free Testosterone with Luteinizing hormone, Follicle stimulating hormone and Prolactin. \par Will inform results. \par \par Erectile dysfunction:\par Reviewed pathophysiology and differential diagnosis of erectile dysfunction with the patient. Discussed lifestyle changes. \par The patient was made aware that the current therapies for erectile dysfunction consisting of:\par Oral phosphodiesterase type 5 inhibitors like Viagra/Sildenafil and Cialis/Tadalafil. \par Vacuum erection devices. \par Intraurethral suppository. \par Intracavernous vasoactive drug injection.\par Penile prosthesis implantation.\par Risks and benefits of each were discussed. All questions were answered.\par Will try Sildenafil. \par \par PSA Screening:\par Discussed PSA screening and latest recommendations/guidelines- USPTF and AUA. \par Will get PSA.\par \par Renal cyst: \par Renal cyst stable.\par \par Benign Prostatic Hyperplasia:\par Prostate volume: 31 cc. \par Discussed treatment options, will continue Flomax. \par \par \par \par \par 
stated

## 2022-11-06 NOTE — PATIENT PROFILE ADULT - HOW PATIENT ADDRESSED, PROFILE
Bud
Acute: current hospitalization  Chronic: recent decompensation and side effects from medications  Protective: supportive daughter with stable residence

## 2022-11-29 ENCOUNTER — NON-APPOINTMENT (OUTPATIENT)
Age: 52
End: 2022-11-29

## 2022-11-29 DIAGNOSIS — N52.9 MALE ERECTILE DYSFUNCTION, UNSPECIFIED: ICD-10-CM

## 2022-12-06 LAB
FSH SERPL-MCNC: 4.2 IU/L
LH SERPL-ACNC: 3.1 IU/L
PROLACTIN SERPL-MCNC: 8.2 NG/ML
PSA SERPL-MCNC: 1.95 NG/ML
TESTOST FREE SERPL-MCNC: 12 PG/ML
TESTOST SERPL-MCNC: 302 NG/DL

## 2023-09-13 ENCOUNTER — APPOINTMENT (OUTPATIENT)
Dept: DERMATOLOGY | Facility: CLINIC | Age: 53
End: 2023-09-13
Payer: COMMERCIAL

## 2023-09-13 DIAGNOSIS — D18.00 HEMANGIOMA UNSPECIFIED SITE: ICD-10-CM

## 2023-09-13 DIAGNOSIS — L57.0 ACTINIC KERATOSIS: ICD-10-CM

## 2023-09-13 DIAGNOSIS — L73.9 FOLLICULAR DISORDER, UNSPECIFIED: ICD-10-CM

## 2023-09-13 DIAGNOSIS — L81.4 OTHER MELANIN HYPERPIGMENTATION: ICD-10-CM

## 2023-09-13 PROCEDURE — 17000 DESTRUCT PREMALG LESION: CPT

## 2023-09-13 PROCEDURE — 99214 OFFICE O/P EST MOD 30 MIN: CPT | Mod: 25

## 2023-09-13 RX ORDER — CLINDAMYCIN PHOSPHATE 10 MG/ML
1 SOLUTION TOPICAL
Qty: 1 | Refills: 5 | Status: DISCONTINUED | COMMUNITY
Start: 2020-09-16 | End: 2023-09-13

## 2023-09-17 NOTE — ASU PREOP CHECKLIST - VIA
Goal Outcome Evaluation:           Progress: no change  Outcome Evaluation: monitor          stretcher

## 2023-09-20 ENCOUNTER — APPOINTMENT (OUTPATIENT)
Dept: UROLOGY | Facility: CLINIC | Age: 53
End: 2023-09-20
Payer: COMMERCIAL

## 2023-09-20 VITALS
HEIGHT: 71 IN | DIASTOLIC BLOOD PRESSURE: 82 MMHG | WEIGHT: 255 LBS | SYSTOLIC BLOOD PRESSURE: 135 MMHG | BODY MASS INDEX: 35.7 KG/M2

## 2023-09-20 PROCEDURE — 99214 OFFICE O/P EST MOD 30 MIN: CPT

## 2023-09-21 ENCOUNTER — APPOINTMENT (OUTPATIENT)
Dept: ULTRASOUND IMAGING | Facility: CLINIC | Age: 53
End: 2023-09-21
Payer: COMMERCIAL

## 2023-09-21 PROCEDURE — 76770 US EXAM ABDO BACK WALL COMP: CPT

## 2024-01-17 ENCOUNTER — APPOINTMENT (OUTPATIENT)
Dept: UROLOGY | Facility: CLINIC | Age: 54
End: 2024-01-17
Payer: COMMERCIAL

## 2024-01-17 VITALS
WEIGHT: 255 LBS | BODY MASS INDEX: 35.7 KG/M2 | DIASTOLIC BLOOD PRESSURE: 80 MMHG | SYSTOLIC BLOOD PRESSURE: 139 MMHG | HEIGHT: 71 IN

## 2024-01-17 DIAGNOSIS — N28.1 CYST OF KIDNEY, ACQUIRED: ICD-10-CM

## 2024-01-17 PROCEDURE — 96372 THER/PROPH/DIAG INJ SC/IM: CPT

## 2024-01-17 PROCEDURE — 99214 OFFICE O/P EST MOD 30 MIN: CPT | Mod: 25

## 2024-01-17 RX ORDER — TESTOSTERONE CYPIONATE 200 MG/ML
200 INJECTION, SOLUTION INTRAMUSCULAR
Refills: 0 | Status: COMPLETED | OUTPATIENT
Start: 2024-01-17

## 2024-01-17 RX ORDER — TESTOSTERONE CYPIONATE 200 MG/ML
200 INJECTION, SOLUTION INTRAMUSCULAR
Refills: 0 | Status: COMPLETED | COMMUNITY
Start: 2024-01-17 | End: 2024-01-17

## 2024-01-17 RX ORDER — TESTOSTERONE CYPIONATE 200 MG/ML
200 INJECTION, SOLUTION INTRAMUSCULAR
Qty: 10 | Refills: 0 | Status: ACTIVE | COMMUNITY
Start: 2024-01-17 | End: 1900-01-01

## 2024-01-17 RX ORDER — NEEDLES, DISPOSABLE 25GX5/8"
18G X 1-1/2" NEEDLE, DISPOSABLE MISCELLANEOUS
Qty: 12 | Refills: 3 | Status: ACTIVE | COMMUNITY
Start: 2024-01-17 | End: 1900-01-01

## 2024-01-17 RX ORDER — SYRINGE WITH NEEDLE, 1 ML 25GX5/8"
21G X 1" SYRINGE, EMPTY DISPOSABLE MISCELLANEOUS
Qty: 6 | Refills: 0 | Status: ACTIVE | COMMUNITY
Start: 2024-01-17 | End: 1900-01-01

## 2024-01-17 RX ADMIN — TESTOSTERONE CYPIONATE 0 MG/ML: 200 INJECTION INTRAMUSCULAR at 00:00

## 2024-01-21 PROBLEM — N28.1 RENAL CYST: Status: ACTIVE | Noted: 2021-11-19

## 2024-01-21 NOTE — LETTER BODY
[Dear  ___] : Dear  [unfilled], [Courtesy Letter:] : I had the pleasure of seeing your patient, [unfilled], in my office today. [Please see my note below.] : Please see my note below. [Sincerely,] : Sincerely, [FreeTextEntry3] : Chandler Metzger MD  of Urology [facility]  [facility] Offices: [address] [facility], [address] [place] [address], [facility] [address] [facility], [place] [address]  TEL: [phone] FAX: [phone]

## 2024-01-21 NOTE — END OF VISIT
[Time Spent: ___ minutes] : I have spent [unfilled] minutes of time on the encounter. [FreeTextEntry3] : Documented by Radha Peterson acting as a scribe for Dr. Chandler Metzger. 01/17/2024   All medical record entries made by the Scribe were at my, Dr. Cahndler Metzger, direction and personally dictated by me on 01/17/2024. I have reviewed the chart and agree that the record accurately reflects my personal performance of the history, physical exam, assessment and plan. I have also personally directed, reviewed, and agreed with the chart.

## 2024-01-21 NOTE — ADDENDUM
[FreeTextEntry1] :  Documented by Radha Peterson acting as a scribe under Dr. Chandler Metzger. 01/17/2024

## 2024-01-21 NOTE — ASSESSMENT
[FreeTextEntry1] : Reviewed records. Discussed labs and imaging.   9/22/2023: Creatinine: 1.10 Testosterone: 576 PSA: 2.8   Benign Prostatic Hyperplasia: Prostate volume: 33 cc on RBUS.  Discussed treatment options, will continue Flomax.   Renal cyst: Discussed that Renal cysts are a common finding on routine radiological studies. Autopsy studies in patients over the age of 50 reveal greater than a 50% chance of having at least one simple renal cyst. And that renal cysts may be classified as simple or complex depending how they look on imaging. Discussed complexity of renal cysts and its implications as regards malignancy.   Hypogonadism: Nurse did the testosterone self-injection teaching, will arrange for supplies. Will do Testosterone cypionate 120 mg every week.  Will get full panel fasting labs: Total Testosterone, Complete blood count, Comprehensive metabolic panel, Hgb A1c, Lipid profile, PSA and Estradiol in 3 months.   Return to office in 6 months or sooner if any issues.

## 2024-02-05 ENCOUNTER — NON-APPOINTMENT (OUTPATIENT)
Age: 54
End: 2024-02-05

## 2024-02-10 NOTE — PROVIDER CONTACT NOTE (OTHER) - SITUATION
nephrolithiasis and flank pain    left message with ans service
spoke with Lashae, on call service aware of patient admission
no

## 2024-03-27 ENCOUNTER — APPOINTMENT (OUTPATIENT)
Dept: UROLOGY | Facility: CLINIC | Age: 54
End: 2024-03-27
Payer: COMMERCIAL

## 2024-03-27 VITALS
OXYGEN SATURATION: 97 % | HEIGHT: 71 IN | HEART RATE: 76 BPM | SYSTOLIC BLOOD PRESSURE: 121 MMHG | RESPIRATION RATE: 16 BRPM | DIASTOLIC BLOOD PRESSURE: 80 MMHG | WEIGHT: 255 LBS | BODY MASS INDEX: 35.7 KG/M2

## 2024-03-27 DIAGNOSIS — Z87.442 PERSONAL HISTORY OF URINARY CALCULI: ICD-10-CM

## 2024-03-27 DIAGNOSIS — Z12.5 ENCOUNTER FOR SCREENING FOR MALIGNANT NEOPLASM OF PROSTATE: ICD-10-CM

## 2024-03-27 DIAGNOSIS — Z87.440 PERSONAL HISTORY OF URINARY (TRACT) INFECTIONS: ICD-10-CM

## 2024-03-27 DIAGNOSIS — E29.1 TESTICULAR HYPOFUNCTION: ICD-10-CM

## 2024-03-27 DIAGNOSIS — N13.8 BENIGN PROSTATIC HYPERPLASIA WITH LOWER URINARY TRACT SYMPMS: ICD-10-CM

## 2024-03-27 DIAGNOSIS — N40.1 BENIGN PROSTATIC HYPERPLASIA WITH LOWER URINARY TRACT SYMPMS: ICD-10-CM

## 2024-03-27 PROCEDURE — 99214 OFFICE O/P EST MOD 30 MIN: CPT

## 2024-03-29 LAB
APPEARANCE: CLEAR
BACTERIA UR CULT: NORMAL
BACTERIA: NEGATIVE /HPF
BILIRUBIN URINE: NEGATIVE
BLOOD URINE: NEGATIVE
CAST: 0 /LPF
COLOR: YELLOW
EPITHELIAL CELLS: 0 /HPF
GLUCOSE QUALITATIVE U: NEGATIVE MG/DL
KETONES URINE: NEGATIVE MG/DL
LEUKOCYTE ESTERASE URINE: NEGATIVE
MICROSCOPIC-UA: NORMAL
NITRITE URINE: NEGATIVE
PH URINE: 6
PROTEIN URINE: NORMAL MG/DL
RED BLOOD CELLS URINE: 1 /HPF
SPECIFIC GRAVITY URINE: 1.02
UROBILINOGEN URINE: 0.2 MG/DL
WHITE BLOOD CELLS URINE: 0 /HPF

## 2024-03-31 LAB
HCT VFR BLD CALC: 47.9 %
HGB BLD-MCNC: 15.8 G/DL

## 2024-03-31 NOTE — REVIEW OF SYSTEMS
[see HPI] : see HPI [Negative] : Heme/Lymph [Nocturia] : nocturia [History of kidney stones] : history of kidney stones [Wake up at night to urinate  How many times?  ___] : wakes up to urinate [unfilled] times during the night [Dysuria] : no dysuria

## 2024-03-31 NOTE — LETTER BODY
[Dear  ___] : Dear  [unfilled], [Please see my note below.] : Please see my note below. [Courtesy Letter:] : I had the pleasure of seeing your patient, [unfilled], in my office today. [Sincerely,] : Sincerely, [FreeTextEntry3] : Chandler Metzger MD  of Urology [facility]  [facility] Offices: [address] [facility], [address] [place] [address], [facility] [address] [facility], [place] [address]  TEL: [phone] FAX: [phone]

## 2024-03-31 NOTE — PHYSICAL EXAM
[Normal Appearance] : normal appearance [Well Groomed] : well groomed [General Appearance - In No Acute Distress] : no acute distress [Edema] : no peripheral edema [Respiration, Rhythm And Depth] : normal respiratory rhythm and effort [Abdomen Soft] : soft [Abdomen Tenderness] : non-tender [Exaggerated Use Of Accessory Muscles For Inspiration] : no accessory muscle use [Urinary Bladder Findings] : the bladder was normal on palpation [Costovertebral Angle Tenderness] : no ~M costovertebral angle tenderness [Normal Station and Gait] : the gait and station were normal for the patient's age [] : no rash [No Focal Deficits] : no focal deficits [Affect] : the affect was normal [Mood] : the mood was normal [Oriented To Time, Place, And Person] : oriented to person, place, and time [No Palpable Adenopathy] : no palpable adenopathy [FreeTextEntry1] : normal peripheral circulation

## 2024-03-31 NOTE — ASSESSMENT
[FreeTextEntry1] : Reviewed records. Discussed labs and imaging.   03/19/2024: Testosterone: 693 PSA: 2.8   Kidney Stones: Last CT showed no stones.   Benign Prostatic Hyperplasia: Prostate volume: 33 cc on RBUS (09/21/23) Discussed treatment options, will continue Flomax.   PSA screen: Last PSA 2.8.  Recent UTI: Will repeat UA and UC.   Hypogonadism: Continue testosterone self-injection therapy. Will do Testosterone cypionate 120 mg every week.  Will repeat H/H today.  Will get Total Testosterone, Hemoglobin and Hematocrit in 3 months. Will get full panel fasting labs: Total Testosterone, Complete blood count, Comprehensive metabolic panel, Hgb A1c, Lipid profile, PSA and Estradiol in 6 months.    Return to office in 6 months or sooner if any issues.
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
## 2024-04-03 RX ORDER — CLINDAMYCIN PHOSPHATE 1 G/10ML
1 GEL TOPICAL
Qty: 1 | Refills: 3 | Status: ACTIVE | COMMUNITY
Start: 2023-09-13 | End: 1900-01-01

## 2024-04-03 RX ORDER — BENZOYL PEROXIDE 100 MG/ML
10 LIQUID TOPICAL
Qty: 1 | Refills: 5 | Status: ACTIVE | COMMUNITY
Start: 2023-09-13 | End: 1900-01-01

## 2024-07-17 ENCOUNTER — APPOINTMENT (OUTPATIENT)
Dept: UROLOGY | Facility: CLINIC | Age: 54
End: 2024-07-17

## 2024-09-11 ENCOUNTER — APPOINTMENT (OUTPATIENT)
Dept: DERMATOLOGY | Facility: CLINIC | Age: 54
End: 2024-09-11

## 2024-09-17 ENCOUNTER — APPOINTMENT (OUTPATIENT)
Dept: DERMATOLOGY | Facility: CLINIC | Age: 54
End: 2024-09-17
Payer: COMMERCIAL

## 2024-09-17 VITALS — WEIGHT: 240 LBS | BODY MASS INDEX: 33.6 KG/M2 | HEIGHT: 71 IN

## 2024-09-17 DIAGNOSIS — Z12.83 ENCOUNTER FOR SCREENING FOR MALIGNANT NEOPLASM OF SKIN: ICD-10-CM

## 2024-09-17 DIAGNOSIS — L81.4 OTHER MELANIN HYPERPIGMENTATION: ICD-10-CM

## 2024-09-17 DIAGNOSIS — D22.9 MELANOCYTIC NEVI, UNSPECIFIED: ICD-10-CM

## 2024-09-17 PROCEDURE — 99213 OFFICE O/P EST LOW 20 MIN: CPT

## 2024-09-25 ENCOUNTER — APPOINTMENT (OUTPATIENT)
Dept: UROLOGY | Facility: CLINIC | Age: 54
End: 2024-09-25

## 2024-09-25 VITALS
OXYGEN SATURATION: 98 % | HEART RATE: 55 BPM | DIASTOLIC BLOOD PRESSURE: 77 MMHG | BODY MASS INDEX: 33.6 KG/M2 | WEIGHT: 240 LBS | RESPIRATION RATE: 16 BRPM | SYSTOLIC BLOOD PRESSURE: 123 MMHG | HEIGHT: 71 IN

## 2024-09-25 DIAGNOSIS — R97.20 ELEVATED PROSTATE, SPECIFIC ANTIGEN [PSA]: ICD-10-CM

## 2024-09-25 DIAGNOSIS — N40.1 BENIGN PROSTATIC HYPERPLASIA WITH LOWER URINARY TRACT SYMPMS: ICD-10-CM

## 2024-09-25 DIAGNOSIS — Z87.442 PERSONAL HISTORY OF URINARY CALCULI: ICD-10-CM

## 2024-09-25 DIAGNOSIS — N13.8 BENIGN PROSTATIC HYPERPLASIA WITH LOWER URINARY TRACT SYMPMS: ICD-10-CM

## 2024-09-25 DIAGNOSIS — E29.1 TESTICULAR HYPOFUNCTION: ICD-10-CM

## 2024-09-25 PROCEDURE — 96372 THER/PROPH/DIAG INJ SC/IM: CPT

## 2024-09-25 PROCEDURE — 99214 OFFICE O/P EST MOD 30 MIN: CPT | Mod: 25

## 2024-09-25 RX ORDER — TESTOSTERONE CYPIONATE 200 MG/ML
200 INJECTION, SOLUTION INTRAMUSCULAR
Refills: 0 | Status: COMPLETED | OUTPATIENT
Start: 2024-09-25

## 2024-09-25 RX ORDER — TESTOSTERONE CYPIONATE 200 MG/ML
200 INJECTION, SOLUTION INTRAMUSCULAR
Refills: 0 | Status: COMPLETED | COMMUNITY
Start: 2024-09-25 | End: 2024-09-25

## 2024-09-25 RX ADMIN — TESTOSTERONE CYPIONATE 0 MG/ML: 200 INJECTION INTRAMUSCULAR at 00:00

## 2024-10-06 NOTE — ASSESSMENT
[FreeTextEntry1] : Benign Prostatic Hyperplasia: Prostate volume: 33 cc on RBUS (09/21/23). Discussed treatment options, will continue Flomax.   Rising PSA:  Last PSA went up to 3.6. Will repeat PSA Total and Free with next labs.   Hypogonadism: Continue testosterone self-injection therapy. Will reduce Testosterone cypionate to 100 mg every week.  Educated patient that Testosterone level should be checked 6 days post injection for most accurate results.  Received Testosterone Cypionate 100 mg today. Will get Total Testosterone, Hemoglobin and Hematocrit in 3 months. Will get full panel fasting labs: Total Testosterone, Complete blood count, Comprehensive metabolic panel, Hgb A1c, Lipid profile, PSA and Estradiol in 6 months.    Return to office in 6 months or sooner if any issues.

## 2024-10-06 NOTE — LETTER BODY
[Dear  ___] : Dear  [unfilled], [Courtesy Letter:] : I had the pleasure of seeing your patient, [unfilled], in my office today. [Please see my note below.] : Please see my note below. [Sincerely,] : Sincerely, [FreeTextEntry3] : Chandler Metzger MD  of Urology [facility]  [facility] Offices: [address], [address] [place] [address], [facility] [address] [facility], [place] [address]  TEL: [phone] FAX: [phone]
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
## 2024-10-06 NOTE — REVIEW OF SYSTEMS
[see HPI] : see HPI [Nocturia] : nocturia [History of kidney stones] : history of kidney stones [Wake up at night to urinate  How many times?  ___] : wakes up to urinate [unfilled] times during the night [Negative] : Heme/Lymph [Dysuria] : no dysuria
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
## 2024-10-06 NOTE — LETTER BODY
[Dear  ___] : Dear  [unfilled], [Courtesy Letter:] : I had the pleasure of seeing your patient, [unfilled], in my office today. [Please see my note below.] : Please see my note below. [Sincerely,] : Sincerely, [FreeTextEntry3] : Chandler Metzger MD  of Urology [facility] [employer] [facility]  [facility] Offices: [address] [facility], [address] [place] [address], [facility] [address] [facility], [place] [address]  TEL: [phone] FAX: [phone]

## 2024-10-06 NOTE — PHYSICAL EXAM
[Normal Appearance] : normal appearance [Well Groomed] : well groomed [General Appearance - In No Acute Distress] : no acute distress [] : no respiratory distress [Respiration, Rhythm And Depth] : normal respiratory rhythm and effort [Exaggerated Use Of Accessory Muscles For Inspiration] : no accessory muscle use [Abdomen Soft] : soft [Abdomen Tenderness] : non-tender [Urinary Bladder Findings] : the bladder was normal on palpation [Normal Station and Gait] : the gait and station were normal for the patient's age [No Focal Deficits] : no focal deficits [Oriented To Time, Place, And Person] : oriented to person, place, and time [Affect] : the affect was normal [Mood] : the mood was normal [FreeTextEntry1] : normal peripheral circulation

## 2024-10-06 NOTE — END OF VISIT
[Time Spent: ___ minutes] : I have spent [unfilled] minutes of time on the encounter which excludes teaching and separately reported services. [FreeTextEntry3] : Patient was seen with Nurse Practitioner and examined by me. Agree with above note, where necessary edits were made.   Parts of this note were generated by using Dragon medical dictation software.  A reasonable effort was made to proofread and correct its contents, but typos and mistakes may still remain.If there are any questions or points of clarification needed, please contact my office.   Prior to appointment and during encounter with patient extensive medical records were reviewed including but not limited to, hospital records, outpatient records, imaging results and lab data if available. During this appointment the patient was examined, diagnoses were discussed and explained in a face-to-face manner. In addition, extensive time was spent reviewing aforementioned diagnostic studies. Counseling including test results, differential diagnoses, treatment options, risk and benefits, lifestyle changes, current condition, and current medications was performed. Patient's questions and concerns were addressed. Patient verbalized understanding of the treatment plan. Time spent is for reviewing chart, labs and images if available, counseling and care coordination.

## 2024-10-29 ENCOUNTER — NON-APPOINTMENT (OUTPATIENT)
Age: 54
End: 2024-10-29

## 2024-11-15 ENCOUNTER — NON-APPOINTMENT (OUTPATIENT)
Age: 54
End: 2024-11-15

## 2024-11-15 RX ORDER — NEEDLES, DISPOSABLE 25GX5/8"
18G X 1" NEEDLE, DISPOSABLE MISCELLANEOUS
Qty: 12 | Refills: 1 | Status: ACTIVE | COMMUNITY
Start: 2024-11-15 | End: 1900-01-01

## 2024-11-15 RX ORDER — SYRINGE, DISPOSABLE, 1 ML
3 ML SYRINGE, EMPTY DISPOSABLE MISCELLANEOUS
Qty: 12 | Refills: 1 | Status: ACTIVE | COMMUNITY
Start: 2024-11-15 | End: 1900-01-01

## 2024-11-15 RX ORDER — NEEDLES, DISPOSABLE 25GX5/8"
22G X 1" NEEDLE, DISPOSABLE MISCELLANEOUS
Qty: 12 | Refills: 1 | Status: ACTIVE | COMMUNITY
Start: 2024-11-15 | End: 1900-01-01

## 2024-11-22 ENCOUNTER — NON-APPOINTMENT (OUTPATIENT)
Age: 54
End: 2024-11-22

## 2024-11-22 DIAGNOSIS — E29.1 TESTICULAR HYPOFUNCTION: ICD-10-CM

## 2025-03-26 ENCOUNTER — APPOINTMENT (OUTPATIENT)
Dept: UROLOGY | Facility: CLINIC | Age: 55
End: 2025-03-26

## 2025-03-26 VITALS
BODY MASS INDEX: 33.6 KG/M2 | RESPIRATION RATE: 16 BRPM | DIASTOLIC BLOOD PRESSURE: 81 MMHG | HEIGHT: 71 IN | SYSTOLIC BLOOD PRESSURE: 129 MMHG | HEART RATE: 61 BPM | OXYGEN SATURATION: 99 % | WEIGHT: 240 LBS

## 2025-03-26 DIAGNOSIS — Z87.442 PERSONAL HISTORY OF URINARY CALCULI: ICD-10-CM

## 2025-03-26 DIAGNOSIS — E29.1 TESTICULAR HYPOFUNCTION: ICD-10-CM

## 2025-03-26 DIAGNOSIS — R97.20 ELEVATED PROSTATE, SPECIFIC ANTIGEN [PSA]: ICD-10-CM

## 2025-03-26 DIAGNOSIS — N13.8 BENIGN PROSTATIC HYPERPLASIA WITH LOWER URINARY TRACT SYMPMS: ICD-10-CM

## 2025-03-26 DIAGNOSIS — N40.1 BENIGN PROSTATIC HYPERPLASIA WITH LOWER URINARY TRACT SYMPMS: ICD-10-CM

## 2025-03-26 PROCEDURE — 99214 OFFICE O/P EST MOD 30 MIN: CPT | Mod: 25

## 2025-03-26 PROCEDURE — 96372 THER/PROPH/DIAG INJ SC/IM: CPT

## 2025-03-26 RX ORDER — TESTOSTERONE CYPIONATE 200 MG/ML
200 INJECTION, SOLUTION INTRAMUSCULAR
Refills: 0 | Status: COMPLETED | OUTPATIENT
Start: 2025-03-26

## 2025-03-26 RX ADMIN — TESTOSTERONE CYPIONATE 0 MG/ML: 200 INJECTION INTRAMUSCULAR at 00:00

## 2025-06-25 ENCOUNTER — APPOINTMENT (OUTPATIENT)
Dept: UROLOGY | Facility: CLINIC | Age: 55
End: 2025-06-25

## 2025-06-25 PROCEDURE — 99214 OFFICE O/P EST MOD 30 MIN: CPT | Mod: 25

## 2025-06-25 PROCEDURE — 51741 ELECTRO-UROFLOWMETRY FIRST: CPT

## 2025-08-05 ENCOUNTER — NON-APPOINTMENT (OUTPATIENT)
Age: 55
End: 2025-08-05

## 2025-08-07 ENCOUNTER — APPOINTMENT (OUTPATIENT)
Dept: MRI IMAGING | Facility: CLINIC | Age: 55
End: 2025-08-07
Payer: COMMERCIAL

## 2025-08-07 ENCOUNTER — RESULT REVIEW (OUTPATIENT)
Age: 55
End: 2025-08-07

## 2025-08-07 ENCOUNTER — OUTPATIENT (OUTPATIENT)
Dept: OUTPATIENT SERVICES | Facility: HOSPITAL | Age: 55
LOS: 1 days | End: 2025-08-07
Payer: COMMERCIAL

## 2025-08-07 DIAGNOSIS — Z98.890 OTHER SPECIFIED POSTPROCEDURAL STATES: Chronic | ICD-10-CM

## 2025-08-07 DIAGNOSIS — R97.20 ELEVATED PROSTATE SPECIFIC ANTIGEN [PSA]: ICD-10-CM

## 2025-08-07 PROCEDURE — 72197 MRI PELVIS W/O & W/DYE: CPT

## 2025-08-07 PROCEDURE — 76498 UNLISTED MR PROCEDURE: CPT

## 2025-08-07 PROCEDURE — 76498P: CUSTOM | Mod: 26

## 2025-08-07 PROCEDURE — 72197 MRI PELVIS W/O & W/DYE: CPT | Mod: 26

## 2025-08-07 PROCEDURE — A9585: CPT

## 2025-08-13 ENCOUNTER — RESULT REVIEW (OUTPATIENT)
Age: 55
End: 2025-08-13

## 2025-08-13 ENCOUNTER — NON-APPOINTMENT (OUTPATIENT)
Age: 55
End: 2025-08-13

## 2025-08-13 ENCOUNTER — OUTPATIENT (OUTPATIENT)
Dept: OUTPATIENT SERVICES | Facility: HOSPITAL | Age: 55
LOS: 1 days | End: 2025-08-13
Payer: COMMERCIAL

## 2025-08-13 DIAGNOSIS — Z98.890 OTHER SPECIFIED POSTPROCEDURAL STATES: Chronic | ICD-10-CM

## 2025-08-13 DIAGNOSIS — Z00.8 ENCOUNTER FOR OTHER GENERAL EXAMINATION: ICD-10-CM

## 2025-08-13 DIAGNOSIS — R97.20 ELEVATED PROSTATE SPECIFIC ANTIGEN [PSA]: ICD-10-CM

## 2025-08-13 PROCEDURE — C8001: CPT

## 2025-08-14 ENCOUNTER — APPOINTMENT (OUTPATIENT)
Dept: UROLOGY | Facility: CLINIC | Age: 55
End: 2025-08-14
Payer: COMMERCIAL

## 2025-08-14 VITALS
WEIGHT: 240 LBS | HEIGHT: 71 IN | OXYGEN SATURATION: 98 % | SYSTOLIC BLOOD PRESSURE: 110 MMHG | BODY MASS INDEX: 33.6 KG/M2 | HEART RATE: 62 BPM | DIASTOLIC BLOOD PRESSURE: 70 MMHG | TEMPERATURE: 96.5 F

## 2025-08-14 DIAGNOSIS — Z12.5 ENCOUNTER FOR SCREENING FOR MALIGNANT NEOPLASM OF PROSTATE: ICD-10-CM

## 2025-08-14 PROCEDURE — 99214 OFFICE O/P EST MOD 30 MIN: CPT

## 2025-08-15 LAB
ANION GAP SERPL CALC-SCNC: 15 MMOL/L
APPEARANCE: CLEAR
BACTERIA: NEGATIVE /HPF
BILIRUBIN URINE: NEGATIVE
BLOOD URINE: NEGATIVE
BUN SERPL-MCNC: 20 MG/DL
CALCIUM SERPL-MCNC: 10.1 MG/DL
CAST: 1 /LPF
CHLORIDE SERPL-SCNC: 101 MMOL/L
CO2 SERPL-SCNC: 24 MMOL/L
COLOR: YELLOW
CREAT SERPL-MCNC: 1.14 MG/DL
EGFRCR SERPLBLD CKD-EPI 2021: 75 ML/MIN/1.73M2
EPITHELIAL CELLS: 0 /HPF
GLUCOSE QUALITATIVE U: NEGATIVE MG/DL
GLUCOSE SERPL-MCNC: 58 MG/DL
HCT VFR BLD CALC: 52 %
HGB BLD-MCNC: 16.6 G/DL
KETONES URINE: NEGATIVE MG/DL
LEUKOCYTE ESTERASE URINE: NEGATIVE
MCHC RBC-ENTMCNC: 31.1 PG
MCHC RBC-ENTMCNC: 31.9 G/DL
MCV RBC AUTO: 97.4 FL
MICROSCOPIC-UA: NORMAL
NITRITE URINE: NEGATIVE
PH URINE: 6
PLATELET # BLD AUTO: 158 K/UL
POTASSIUM SERPL-SCNC: 4.4 MMOL/L
PROTEIN URINE: NEGATIVE MG/DL
PSA FREE FLD-MCNC: 22 %
PSA FREE SERPL-MCNC: 0.94 NG/ML
PSA SERPL-MCNC: 4.32 NG/ML
RBC # BLD: 5.34 M/UL
RBC # FLD: 13.1 %
RED BLOOD CELLS URINE: 5 /HPF
SODIUM SERPL-SCNC: 140 MMOL/L
SPECIFIC GRAVITY URINE: 1.02
UROBILINOGEN URINE: 0.2 MG/DL
WBC # FLD AUTO: 6.73 K/UL
WHITE BLOOD CELLS URINE: 0 /HPF

## 2025-08-18 LAB — BACTERIA UR CULT: NORMAL

## 2025-09-09 ENCOUNTER — TRANSCRIPTION ENCOUNTER (OUTPATIENT)
Age: 55
End: 2025-09-09

## 2025-09-09 ENCOUNTER — RESULT REVIEW (OUTPATIENT)
Age: 55
End: 2025-09-09

## 2025-09-09 ENCOUNTER — APPOINTMENT (OUTPATIENT)
Dept: UROLOGY | Facility: HOSPITAL | Age: 55
End: 2025-09-09

## 2025-09-23 PROBLEM — Z12.83 SCREENING FOR SKIN CANCER: Status: ACTIVE | Noted: 2025-09-23 | Resolved: 2025-10-03

## 2025-09-23 PROBLEM — C61 PROSTATE CANCER: Status: ACTIVE | Noted: 2025-09-23
